# Patient Record
Sex: FEMALE | Race: WHITE | NOT HISPANIC OR LATINO | Employment: FULL TIME | URBAN - METROPOLITAN AREA
[De-identification: names, ages, dates, MRNs, and addresses within clinical notes are randomized per-mention and may not be internally consistent; named-entity substitution may affect disease eponyms.]

---

## 2017-01-11 ENCOUNTER — ALLSCRIPTS OFFICE VISIT (OUTPATIENT)
Dept: OTHER | Facility: OTHER | Age: 37
End: 2017-01-11

## 2017-01-11 DIAGNOSIS — Z13.6 ENCOUNTER FOR SCREENING FOR CARDIOVASCULAR DISORDERS: ICD-10-CM

## 2017-01-11 DIAGNOSIS — R53.83 OTHER FATIGUE: ICD-10-CM

## 2017-02-21 ENCOUNTER — ALLSCRIPTS OFFICE VISIT (OUTPATIENT)
Dept: OTHER | Facility: OTHER | Age: 37
End: 2017-02-21

## 2017-02-28 ENCOUNTER — ALLSCRIPTS OFFICE VISIT (OUTPATIENT)
Dept: OTHER | Facility: OTHER | Age: 37
End: 2017-02-28

## 2017-02-28 DIAGNOSIS — G40.409 OTHER GENERALIZED EPILEPSY AND EPILEPTIC SYNDROMES, NOT INTRACTABLE, WITHOUT STATUS EPILEPTICUS (HCC): ICD-10-CM

## 2017-04-27 ENCOUNTER — ALLSCRIPTS OFFICE VISIT (OUTPATIENT)
Dept: OTHER | Facility: OTHER | Age: 37
End: 2017-04-27

## 2017-05-01 DIAGNOSIS — G40.409 OTHER GENERALIZED EPILEPSY AND EPILEPTIC SYNDROMES, NOT INTRACTABLE, WITHOUT STATUS EPILEPTICUS (HCC): ICD-10-CM

## 2017-05-01 DIAGNOSIS — Z13.1 ENCOUNTER FOR SCREENING FOR DIABETES MELLITUS: ICD-10-CM

## 2017-05-01 DIAGNOSIS — R53.83 OTHER FATIGUE: ICD-10-CM

## 2017-05-01 DIAGNOSIS — E66.9 OBESITY: ICD-10-CM

## 2017-05-01 DIAGNOSIS — R63.5 ABNORMAL WEIGHT GAIN: ICD-10-CM

## 2017-05-08 ENCOUNTER — ALLSCRIPTS OFFICE VISIT (OUTPATIENT)
Dept: OTHER | Facility: OTHER | Age: 37
End: 2017-05-08

## 2017-05-11 ENCOUNTER — GENERIC CONVERSION - ENCOUNTER (OUTPATIENT)
Dept: OTHER | Facility: OTHER | Age: 37
End: 2017-05-11

## 2017-05-22 ENCOUNTER — ALLSCRIPTS OFFICE VISIT (OUTPATIENT)
Dept: OTHER | Facility: OTHER | Age: 37
End: 2017-05-22

## 2018-01-09 NOTE — PROGRESS NOTES
MAY 10 2016         RE: Anastacia Fleischer                               To: [facility]   MR#: [id number]                                    5 Antonia Bautista   : Elvi: [id number]                             Fax: [phone]   (Exam #: [id number])      The LMP of this 30year old,  G2, P1-0-0-1 patient was SEP 5 2015, her   working JONO is 2016 and the current gestational age is 29 weeks 6   days by [address] [place] [address]  A sonographic examination was performed on MAY   10 2016 using real time equipment  The ultrasound examination was   performed using abdominal technique  The patient has a BMI of 35 6  Her   blood pressure today was 120/73  Earliest ultrasound found in her record: 12/3/2015   11w1d  2016 JONO      Cardiac motion was observed at 142 bpm       INDICATIONS      previous    epilepsy   age 29 at time of delivery   fetal growth   obesity      Exam Types      Level I      RESULTS      Fetus # 1 of 1   Vertex presentation   Fetal growth appeared normal   Placenta Location = Posterior   No placenta previa   Placenta Grade = II      MEASUREMENTS (* Included In Average GA)      AC              31 3 cm        35 weeks 3 days* (73%)   BPD              8 9 cm        36 weeks 0 days* (82%)   HC              32 1 cm        35 weeks 5 days* (65%)   Femur            6 6 cm        33 weeks 5 days* (52%)      HC/AC           1 03   FL/AC           0 21   FL/BPD          0 74   EFW (Ac/Fl/Hc)  2568 grams - 5 lbs 11 oz                 (60%)      THE AVERAGE GESTATIONAL AGE is 35 weeks 1 day +/- 21 days        AMNIOTIC FLUID      Q1: 5 2      Q2: 4 9      Q3: 5 6      Q4: 3 0   HNUG Total = 18 7 cm   Amniotic Fluid: Normal      ANATOMY DETAILS      Visualized Appearing Sonographically Normal:   HEAD: (BPD Level, Lateral Ventricles);    STOMACH, RIGHT KIDNEY, LEFT   KIDNEY, BLADDER, PLACENTA      Not Visualized:   HEAD: (Cerebellum, Cisterna Magna); HEART      IMPRESSION      Hayes IUP   35 weeks and 1 day by this ultrasound  (JONO=2016)   33 weeks and 6 days by 1st Tri Sono  (JONO=2016)   Vertex presentation   Fetal growth appeared normal   Regular fetal heart rate of 142 bpm   Posterior placenta   No placenta previa      GENERAL COMMENT      On exam today the patient appears well, in no acute distress, and denies   any complaints  Her abdomen is non-tender  There has been appropriate interval fetal growth  Good fetal movement and   tone are seen  The amniotic fluid volume appears normal   The placenta is   posterior and it appears sonographically normal   The anatomic structures   listed above could not be optimally imaged today because of fetal   position; however, these structures were seen on a prior ultrasound and   appeared sonographically normal   The patient was informed of today's   findings and all of her questions were answered  The limitations of   ultrasound were reviewed with the patient   labor precautions as   well as fetal kick counts were reviewed  Recommend further ultrasounds as clinically indicated  Thank you very much for allowing us to participate in the care of this   very nice patient  Should you have any questions, please do not hesitate   to contact our office  Please note, in addition to the time spent discussing the results of the   ultrasound, I spent approximately 10 minutes of face-to-face time with the   patient, greater than 50% of which was spent in counseling and the   coordination of care for this patient  FRANCISCO Edmond M D     Electronically signed 05/10/16 14:41

## 2018-01-10 NOTE — PROGRESS NOTES
History of Present Illness  HPI: Maxwell Garduno is here for 2 wk VLCD f/u  Current wt 209 9 lbs, loss of 9 6 lbs x 2 wks  She will transition to using 2 meal replacements at this time  Hydration is good  Has been dealing with some constipation  States she made the mistake of utilizing all of her fiber products at once  No issues with energy level  She would like to resume exercise (run/walk)  Calorie guidelines given as well as access to volusion  She will f/u in 3 wks for menu planning  Bariatric MNT [facility]:   Weight Assessment: IBW: 135 lbs, ABW: 153 7 lbs, Weight Change: 9 6 lb loss x 2 wks  Dietary Recall:   Beverages: water  Estimated fluid intake: >80 oz  Food allergies/intolerances: nuts  Exercise Frequency:  She exercises run/walk (has been on hold)  Her obesity/being overweight is related to excess energy intake and as evidenced by BMI=32 9  Nutrition Prescription: Estimated calories for weight loss: eCalc BMR 1680, wt loss w/o exercise 1193-1766, w/exercise 5872-5101, Estimated daily protein needs: 74-92 gm (1 2-1 5 gm/kg IBW) and Estimated daily fluid needs: 72 oz (35 cc/kg IBW)  Breakfast: replacement  Snack: food snack  Lunch: replacement  Snack: food snack  Dinner: 220-305, 21: 3 oz lean pro, veggies, 2 fat  Snack: skip   Nutrition Intervention: Counseling provided with emphasis on meal planning, portion sizes, healthy snack choices, hydration, fiber intake, protein intake, exercise and food journaling  Education materials provided: calorie controlled menus and food snacks  Comprehension: good  Expected Compliance: good  Goals: follow meal plan prescribed, plan meals and snacks daily, food journal, do not skip meals or snacks and 820-1015 calories on rest using 2 replacements  Monitor/Evaluation: weekly weight, food journal, fluid intake and exercise level  Active Problems    1  Body mass index (BMI) of 34 0 to 34 9 in adult (V85 34) (Z68 34)   2  Encounter for gynecological examination (V72 31) (Z01 419)   3  Fatigue (780 79) (R53 83)   4  Obesity (278 00) (E66 9)   5  Post-term Pregnancy - Antepartum Condition Or Prior Complicated Delivery (326 47)   6  Screening for diabetes mellitus (V77 1) (Z13 1)   7  Screening for hypertension (V81 1) (Z13 6)   8  Tonic-clonic epileptic seizures (345 10) (G40 409)   9  Weight gain (783 1) (R63 5)    Surgical History    1  History of  Section Low Transverse    Family History  Mother    1  Family history of High blood cholesterol level  Father    2  Family history of diabetes mellitus (V18 0) (Z83 3)    Social History    · Never A Smoker   · No illicit drug use   · Remote social alcohol use    Current Meds   1  Multiple Vitamin TABS; Therapy: (Recorded:75Cph9948) to Recorded   2  Topiramate 100 MG Oral Tablet; TAKE 1 TABLET TWICE DAILY  Requested for:   64Rrk2073; Last Rx:[id number] Ordered    Allergies    1  Sulfa Drugs    2  Nuts   3  No Known Environmental Allergies    Vitals  Signs   Recorded: 90SGG3236 01:25PM   Height: 5 ft 7 in  Weight: 209 lb 14 4 oz  BMI Calculated: 32 88  BSA Calculated: 2 06    Future Appointments    Date/Time Provider Specialty Site   2017 10:00 AM [facility] [place] [facility]   2018 11:00 AM DREA Sequeira  Neurology [employer]     Signatures   Electronically signed by :  Alethea Haas, ; May 22 2017  1:50PM EST                       (Author)    Electronically signed by : DREA Simmons ; May 22 2017  4:25PM EST                       (Co-author)

## 2018-01-10 NOTE — PROGRESS NOTES
2016         RE: Romain Olinda                               To: [facility]   MR#: [id number]                                    5 Antonia Bautista   : [facility]: [id number]:[facility]                             Fax: [phone]   (Exam #: [id number])      The LMP of this 30year old,  2, para 1 patient was SEP 5 2015, her   working JONO is 2016 and the current gestational age is 33 weeks 1   day by [address] [place] [address]  A sonographic examination was performed on 2016 using real time equipment  The ultrasound examination was performed   using abdominal technique  The patient has a BMI of 34 8  Her blood   pressure today was 128/70  Earliest ultrasound found in her record: 12/3/2015   11w1d  2016 JONO            Cardiac motion was observed at 140 bpm       INDICATIONS      previous    epilepsy   age 29 at time of delivery   fetal growth   obesity      Exam Types      Level I      RESULTS      Fetus # 1 of 1   Breech presentation   Fetal growth appeared normal   Placenta Location = Posterior   No placenta previa   Placenta Grade = II      MEASUREMENTS (* Included In Average GA)      AC              25 6 cm        29 weeks 6 days* (59%)   BPD              7 4 cm        29 weeks 5 days* (58%)   HC              27 3 cm        29 weeks 4 days* (46%)   Femur            5 8 cm        30 weeks 1 day * (63%)      Cerebellum       3 6 cm        31 weeks 3 days      HC/AC           1 07   FL/AC           0 23   FL/BPD          0 78   EFW (Ac/Fl/Hc)  1479 grams - 3 lbs 4 oz                 (60%)      THE AVERAGE GESTATIONAL AGE is 29 weeks 6 days +/- 18 days  AMNIOTIC FLUID      Q1: 6 4      Q2: 3 9      Q3: 3 3      Q4: 5 9   HUNG Total = 19 5 cm   Amniotic Fluid: Normal      ANATOMY      Head                                    Normal   Th  Cav                                  Normal   Heart See Details   Abd  Cav  Normal   Stomach                                 Normal   Right Kidney                            Normal   Left Kidney                             Normal   Bladder                                 Normal   Placenta                                Normal      ANATOMY DETAILS      Visualized Appearing Sonographically Normal:   HEAD: (Calvarium, BPD Level, Lateral Ventricles, Choroid Plexus,   Cerebellum, Cisterna Magna);    TH  CAV : (Diaphragm); HEART: (Cardiac   Axis, Interventricular Septum, Interatrial Septum, Cardiac Position);      ABD  CAV , STOMACH, RIGHT KIDNEY, LEFT KIDNEY, BLADDER, PLACENTA      ANATOMY COMMENTS      Fetal anatomy has been previously documented; no anomalies were   identified  No fetal structural abnormality is identified on the Level I   survey today  Follow up anatomy of the lateral ventricles, diaphragm,    kidneys, stomach and bladder appear normal  Fetal interval growth and   amniotic fluid volume are normal       IMPRESSION      Hayes IUP   29 weeks and 6 days by this ultrasound  (JONO=2016)   29 weeks and 1 day by 1st Tri Sono  (JONO=2016)   Breech presentation   Fetal growth appeared normal   Regular fetal heart rate of 140 bpm   Posterior placenta   No placenta previa      GENERAL COMMENT         I had the pleasure of seeing Mira Ruth  in the [facility]    today for followup growth scan  She reports normal daily fetal movements  She denies any vaginal bleeding, leakage of fluid, or significant   contractions or pelvic pressure  There has been no major pregnancy   complications since her last visit here in the  Center  She has a   history of epilepsy  She will be 36 at the time of delivery  She has an   elevated body mass index of 34  Chest has a history of a prior   On today's ultrasound, the fetus was in a breech presentation   The   placenta is posterior in location which is reassuring  The amniotic fluid   appeared very normal today  Fetal growth was within normal range  The   interval anatomy seen today showed no obvious anomalies  We discussed the importance of initiating fetal kick counting at least   once daily  We discussed the "10 kicks in 2 hour rule"  I instructed her   to report to you immediately should criteria not be met  Kick counts   should begin at 28 weeks gestation  IMPORTANT  FINDINGS ON TODAY'S ULTRASOUND: Breech presentation         IN SUMMARY:  Today's ultrasound was reassuring as the fetus is growing   well with normal amniotic fluid  The fetus was in a breech presentation  She should continue to do her kick counts on a daily basis  A fetal growth   evaluation is recommended in 5 weeks and this was scheduled today  Face-to-face time, in addition to time spent discussing ultrasound results   was 10 minutes, with greater than 50% of the time used for counseling and   coordination of care  A description of the counseling/coordination of care   is described above  Thank you very much for allowing us to participate in the care of your   patient  If you have any questions or concerns about today's visit please   do not hesitate to call me  Thank you very much  Judge Janusz KLEIN  Maternal Fetal Medicine      FRANCISCO Hensley M D     Maternal-Fetal Medicine   Electronically signed 04/07/16 18:56

## 2018-01-12 NOTE — PROGRESS NOTES
2016         RE: Dell Has                               To: [facility]   MR#: [id number]                                    Wilber Bautista   : Elvi: [id number]:AIBRIANR                             Fax: [phone]   (Exam #: [id number])      The LMP of this 30year old,  2, para 1 patient was SEP 5 2015, her   working JONO is 2016 and the current gestational age is 22 weeks 1   day by Vijay Gandhi  A sonographic examination was performed on 2016 using real time equipment  The ultrasound examination was   performed using abdominal & vaginal techniques  The patient has a BMI of   32 5  Her blood pressure today was 121/62        Earliest ultrasound found in her record: 12/3/2015   11w1d  2016 JONO            Cardiac motion was observed at 147 bpm       INDICATIONS      previous    epilepsy   age 29 at time of delivery      Exam Types      LEVEL II   Transvaginal      RESULTS      Fetus # 1 of 1   Vertex presentation   Fetal growth appeared normal   Placenta Location = Posterior   No placenta previa   Placenta Grade = I      MEASUREMENTS (* Included In Average GA)      OFD              6 9 cm   AC              15 8 cm        20 weeks 4 days* (43%)   BPD              5 1 cm        21 weeks 3 days* (56%)   HC              19 3 cm        21 weeks 3 days* (59%)   Femur            3 5 cm        21 weeks 3 days* (53%)      Nuchal Fold      5 7 mm      Humerus          3 4 cm        21 weeks 5 days  (61%)   Radius           2 8 cm        21 weeks 6 days   Ulna             3 1 cm        21 weeks 4 days   Tibia            3 2 cm        21 weeks 4 days  (56%)   Fibula           3 2 cm        21 weeks 0 days   Foot             3 7 cm        21 weeks 4 days      Cerebellum       2 2 cm        21 weeks 3 days   CisternaMagna    5 6 mm      HC/AC           1 23   FL/AC           0 22   FL/BPD 0 70   EFW (Ac/Fl/Hc)   399 grams - 0 lbs 14 oz      THE AVERAGE GESTATIONAL AGE is 21 weeks 1 day +/- 10 days  AMNIOTIC FLUID      Largest Vertical Pocket = 4 0 cm   Amniotic Fluid: Normal      CERVICAL EVALUATION           Supine               Cervical Length: 4 40 cm        Other Test Results         Resp To T F  Pressure: No                    Funneling?: No              Dynamic Changes?: No      ANATOMY DETAILS      Visualized Appearing Sonographically Normal:   HEAD: (Calvarium, BPD Level, Lateral Ventricles, Choroid Plexus,   Cerebellum, Cisterna Magna);    FACE/NECK: (Neck, Nuchal Fold, Orbits,   Nose/Lips, Palate, Face);    TH  CAV : (Diaphragm); HEART: (Four   Chamber View, Proximal Left Outflow, Proximal Right Outflow, Aortic Arch,   Ductal Arch, Short Axis of Greater Vessels, Cardiac Axis, Interventricular   Septum, Interatrial Septum, Cardiac Position);    ABD  CAV , STOMACH,   RIGHT KIDNEY, LEFT KIDNEY, BLADDER, ABD  WALL, SPINE: (Cervical Spine,   Thoracic Spine, Lumbar Spine, Sacrum);    EXTREMS: (Lt Humerus, Rt   Humerus, Lt Forearm, Rt Forearm, Lt Hand, Rt Hand, Lt Femur, Rt Femur, Lt   Low Leg, Rt Low Leg, Lt Foot, Rt Foot);    GENITALIA, PLACENTA, UMBL  CORD, UTERUS      Suboptimally Visualized:   FACE/NECK: (Profile)      ADNEXA      The left ovary appeared normal and measured 1 7 x 1 8 x 3 1 cm with a   volume of 5 0 cc  The right ovary appeared normal and measured 2 9 x 2 2 x   1 3 cm with a volume of 4 3 cc  IMPRESSION      Hayes IUP   21 weeks and 1 day by this ultrasound  (JONO=JUN 22 2016)   21 weeks and 1 day by [facility] Tri Sono  (JONO=JUN 22 2016)   Vertex presentation   Fetal growth appeared normal   Regular fetal heart rate of 147 bpm   Posterior placenta   No placenta previa      GENERAL COMMENT      On exam today the patient appears well, in no acute distress, and denies   any complaints  Her abdomen is non-tender        The patient had noninvasive prenatal testing through [place] plus test   Her results were normal, placing her in a   very low risk category  The sensitivity of detecting Trisomy 21 with this   test is 99 1% with a specificity of 99 9%  The sensitivity of detecting   Trisomy 18 is >99 9% with a specificity of 99 6%  The sensitivity of   detecting Trisomy 13 is 91 7% with a specificity of 99 7%  Her negative   result is very reassuring to rule out the aforementioned Trisomies  The fetal anatomic survey is complete for suboptimal visualization of the   fetal profile  3D imaging of the face appears normal   There is no   sonographic evidence of fetal abnormalities at this time  Good fetal   movement and tone are seen  The amniotic fluid volume appears normal     The placenta is posterior and it appears sonographically normal   A   transvaginal ultrasound was performed to assess the cervix, which was not   seen well transabdominally  The cervical length was 4 4 centimeters,   which is normal for the current gestational age  There was no significant   funneling or dynamic changes appreciated  The patient was informed of   today's findings and all of her questions were answered  The limitations   of ultrasound were reviewed with the patient, which she accepts  Recommend the patient return in 8 weeks for a growth ultrasound given her   advanced maternal age  Please note, in addition to the time spent discussing the results of the   ultrasound, I spent approximately 10 minutes of face-to-face time with the   patient, greater than 50% of which was spent in counseling and the   coordination of care for this patient  Thank you very much for allowing us to participate in the care of this   very nice patient  Should you have any questions, please do not hesitate   to contact our office  FRANCISCO Leija M D     Electronically signed 02/11/16 16:18

## 2018-01-12 NOTE — PROGRESS NOTES
Elton Reyes is here for initial RD session for VLCD  Program discussed including: ketosis, hydration, potential side effects, nutrient composition  She is allergic to nuts, states specifically walnuts--no issues with other nuts  Discussed which products contain nuts  2 weeks of product ordered and received  Will f/u 5/11 via phone  Active Problems    1  Body mass index (BMI) of 34 0 to 34 9 in adult (V85 34) (Z68 34)   2  Encounter for gynecological examination (V72 31) (Z01 419)   3  Fatigue (780 79) (R53 83)   4  Obesity (278 00) (E66 9)   5  Post-term Pregnancy - Antepartum Condition Or Prior Complicated Delivery (340 20)   6  Screening for diabetes mellitus (V77 1) (Z13 1)   7  Screening for hypertension (V81 1) (Z13 6)   8  Tonic-clonic epileptic seizures (345 10) (G40 409)   9  Weight gain (783 1) (R63 5)    Current Meds   1  Multiple Vitamin TABS; Therapy: (Recorded:53Cuo3567) to Recorded   2  Topiramate 100 MG Oral Tablet (Topamax); TAKE 1 TABLET TWICE DAILY  Requested   for: 35Apx4941; Last Rx:[id number] Ordered    Allergies    1  Sulfa Drugs    2  Nuts   3  No Known Environmental Allergies    Signatures   Electronically signed by :  Sloan Boo, ; May  8 2017 11:03AM EST                       (Author)

## 2018-01-13 VITALS
HEIGHT: 69 IN | DIASTOLIC BLOOD PRESSURE: 84 MMHG | WEIGHT: 216 LBS | SYSTOLIC BLOOD PRESSURE: 132 MMHG | BODY MASS INDEX: 31.99 KG/M2

## 2018-01-13 VITALS — WEIGHT: 219.5 LBS | BODY MASS INDEX: 34.45 KG/M2 | HEIGHT: 67 IN

## 2018-01-13 VITALS — WEIGHT: 209.9 LBS | HEIGHT: 67 IN | BODY MASS INDEX: 32.94 KG/M2

## 2018-01-17 NOTE — PROGRESS NOTES
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)   4  Obesity (278 00) (E66 9)    Plan  Fatigue    · (1) TSH WITH FT4 REFLEX; Status:Active; Requested ZOX:[id number];   Fatigue, Screening for hypertension    · (1) CBC/PLT/DIFF; Status:Active; Requested ISO:[id number];   Obesity    · *1 - [facility]ician Referral  Evaluate and treat  Difficult with weight loss  Interested in nutritional counseling  Status: Active  Requested  for: [id number]  Care Summary provided  : Yes  Screening for hypertension    · (1) BASIC METABOLIC PROFILE; Status:Active; Requested ONV:91LCG8455;    · (1) LIPID PANEL, FASTING; Status:Active; Requested RRA:[id number];   Tonic-clonic epileptic seizures    · From  Topamax 100 MG Oral Tablet TAKE 1 TABLET TWICE DAILY To  Topiramate 100 MG Oral Tablet (Topamax) TAKE 1 TABLET TWICE DAILY   · *1 - [address] Physician Referral  Patient has Hx pubertal onset grand  mal seizures/epilepsy  Please evaluate and treat  Patient wishes to establish care with  new neurologist   Status: Hold For - Scheduling  Requested for: 27PLE2495  Care Summary provided  : Yes    Discussion/Summary  health maintenance visit Currently, she eats a healthy diet and has an adequate exercise regimen  the risks and benefits of cervical cancer screening were discussed cervical cancer screening is current cervical cancer screening is needed every year cervical cancer screening is managed by OB Colorectal cancer screening: colorectal cancer screening is not indicated  Screening lab work includes glucose, lipid profile and thyroid function testing  The risks and benefits of immunizations were discussed  Advice and education were given regarding nutrition, aerobic exercise and seat belt use  Patient discussion: discussed with the patient  Pap - done last year with OB    Epilepsy - needs new neurologist  Will prescribe Rx until then  Weight gain - referral to weight management center  Recommended diet and exercise  RTC PRN or 1 year  Patient is able to Self-Care  Chief Complaint  Patient here for annual health maintenance visit  History of Present Illness  HM, Adult Female: The patient is being seen for a health maintenance evaluation  General Health: The patient's health since the last visit is described as good  Immunizations status: not up to date  Lifestyle:  She has weight concerns  She does not use tobacco  She denies alcohol use  She denies drug use  Reproductive health:  she reports normal menses  Screening:   Metabolic screening includes no previous lipid profile, no previous glucose screening, no previous thyroid function test and no previous DEXA  HPI: Patient wishes to establish care with new neurologist - she was dismissed from prior office after 2 no-shows  - Onset at puberty, grand mal seizures, has been seizure free for 3 years on Topamax 100 BID   - Not breastfeeding due to medication    Weight gain following last pregnancy  Unable to lose weight  She has been using the stationary bike daily, 10 miles - or walking  Has been logging foods with [employer]  Carb limit of 25g daily  calories generally [id number]  Became tearful discussing weight loss difficulty  She had loss weight after her first pregnancy using this method, but it's not working now  We discussed strategies and gave her a weight management center referral as a "back up" if she feels she needs more help  Review of Systems    Over the past 2 weeks, how often have you been bothered by the following problems? 1 ) Little interest or pleasure in doing things? Not at all    2 ) Feeling down, depressed or hopeless? Not at all    3 ) Trouble falling asleep or sleeping too much? Not at all    4 ) Feeling tired or having little energy? Not at all    5 ) Poor appetite or overeating? Not at all  6  ) Feeling bad about yourself, or that you are a failure, or have let yourself or your family down? Not at all    7 ) Trouble concentrating on things, such as reading a newspaper or watching television? Not at all    8 ) Moving or speaking so slowly that other people could have noticed, or the opposite, moving or speaking faster than usual? Not at all  How difficult have these problems made it for you to do your work, take care of things at home, or get along with people? Not at all  Score      ROS reviewed  Active Problems    1  Post-term Pregnancy - Antepartum Condition Or Prior Complicated Delivery (399 37)    Family History  Mother    · Family history of High blood cholesterol level  Father    · Family history of diabetes mellitus (V18 0) (Z83 3)    Social History    · Never A Smoker    Current Meds   1  Topamax 100 MG Oral Tablet; TAKE 1 TABLET TWICE DAILY; Therapy: (Recorded:74Pmx3543) to Recorded    Allergies    1  Sulfa Drugs    2  Nuts   3  No Known Environmental Allergies    Vitals   Recorded: 14UDT3520 03:58PM   Temperature 99 2 F, Tympanic   Heart Rate 80   Respiration 14   Systolic 880, LUE, Sitting   Diastolic 80, LUE, Sitting   BP CUFF SIZE Large   Height 5 ft 9 in   Weight 224 lb    BMI Calculated 33 08   BSA Calculated 2 17   Pain Scale 0     Physical Exam    Constitutional   General appearance: No acute distress, well appearing and well nourished  overweight  Eyes   Conjunctiva and lids: No swelling, erythema or discharge  Pupils and irises: Equal, round, reactive to light  Ears, Nose, Mouth, and Throat   Nasal mucosa, septum, and turbinates: Normal without edema or erythema  Oropharynx: Normal with no erythema, edema, exudate or lesions  Neck   Thyroid: Normal, no thyromegaly  Pulmonary   Respiratory effort: No increased work of breathing or signs of respiratory distress  Auscultation of lungs: Clear to auscultation      Cardiovascular   Auscultation of heart: Normal rate and rhythm, normal S1 and S2, no murmurs  Musculoskeletal   Gait and station: Normal     Skin   Skin and subcutaneous tissue: Normal without rashes or lesions  Psychiatric   Judgment and insight: Normal     Orientation to person, place, and time: Normal        Attending Note  Attending Note: Attending Note: I discussed the case with the Resident and reviewed the Resident's note and I agree with the Resident management plan as it was presented to me  Level of Participation: I was present in clinic, but did not examine the patient  Diagnosis and Plan: Health maintenance: update with pap  Epilepsy: renew AED, need to follow up with neurologist  I agree with the Resident's note  Future Appointments    Date/Time Provider Specialty Site   02/21/2017 01:00 PM DREA De La Rosa   Obstetrics/Gynecology [facility]     Signatures   Electronically signed by : DREA Mario ; Jan 11 2017  5:23PM EST                       (Author)    Electronically signed by : DREA Mcgraw ; Jan 16 2017  4:39PM EST                       (Author)

## 2018-01-22 VITALS
HEART RATE: 80 BPM | HEIGHT: 69 IN | BODY MASS INDEX: 33.18 KG/M2 | SYSTOLIC BLOOD PRESSURE: 118 MMHG | DIASTOLIC BLOOD PRESSURE: 80 MMHG | TEMPERATURE: 99.2 F | RESPIRATION RATE: 14 BRPM | WEIGHT: 224 LBS

## 2018-01-22 VITALS
WEIGHT: 218.7 LBS | TEMPERATURE: 97.9 F | DIASTOLIC BLOOD PRESSURE: 64 MMHG | SYSTOLIC BLOOD PRESSURE: 110 MMHG | BODY MASS INDEX: 34.33 KG/M2 | HEIGHT: 67 IN | HEART RATE: 68 BPM

## 2018-02-12 ENCOUNTER — TELEPHONE (OUTPATIENT)
Dept: NEUROLOGY | Facility: CLINIC | Age: 38
End: 2018-02-12

## 2018-02-22 RX ORDER — MULTIVITAMIN
1 TABLET ORAL DAILY
COMMUNITY

## 2018-02-27 ENCOUNTER — OFFICE VISIT (OUTPATIENT)
Dept: NEUROLOGY | Facility: CLINIC | Age: 38
End: 2018-02-27
Payer: COMMERCIAL

## 2018-02-27 VITALS
HEART RATE: 72 BPM | WEIGHT: 210.2 LBS | HEIGHT: 69 IN | DIASTOLIC BLOOD PRESSURE: 80 MMHG | SYSTOLIC BLOOD PRESSURE: 130 MMHG | BODY MASS INDEX: 31.13 KG/M2

## 2018-02-27 DIAGNOSIS — G40.409 TONIC-CLONIC EPILEPTIC SEIZURES (HCC): Primary | ICD-10-CM

## 2018-02-27 DIAGNOSIS — G43.109 MIGRAINE WITH AURA AND WITHOUT STATUS MIGRAINOSUS, NOT INTRACTABLE: ICD-10-CM

## 2018-02-27 PROCEDURE — 99214 OFFICE O/P EST MOD 30 MIN: CPT | Performed by: PSYCHIATRY & NEUROLOGY

## 2018-02-27 RX ORDER — TOPIRAMATE 50 MG/1
150 TABLET, FILM COATED ORAL 2 TIMES DAILY
Qty: 180 TABLET | Refills: 11 | Status: SHIPPED | OUTPATIENT
Start: 2018-02-27 | End: 2018-12-12 | Stop reason: SDUPTHER

## 2018-02-27 RX ORDER — ZOLMITRIPTAN 5 MG/1
1 SPRAY NASAL AS NEEDED
Qty: 6 ML | Refills: 5 | Status: SHIPPED | OUTPATIENT
Start: 2018-02-27 | End: 2020-06-19 | Stop reason: ALTCHOICE

## 2018-02-27 NOTE — Clinical Note
Please obtain office notes and records related to MRIs from Dr Adelina Lion  Obtain imaging on disc once MRI imaging reports are obtained  Patient tells me she was getting annual MRI's with Dr Adelina Lion in the past, but I have yet to see any reports

## 2018-02-27 NOTE — PROGRESS NOTES
Patient ID: Glenda Pruett is a 40 y o  female  Assessment/Plan:    No problem-specific Assessment & Plan notes found for this encounter  {Assess/PlanSmartLinks:86949}       Subjective:    HPI    {[facility] HPI texts:64350}    {Common ambulatory SmartLinks:25670}         Objective:    Blood pressure 130/80, pulse 72, height 5' 9" (1 753 m), weight 95 3 kg (210 lb 3 2 oz), not currently breastfeeding  Physical Exam    Neurological Exam      ROS:    Review of Systems   Constitutional: Negative  Negative for appetite change and fever  HENT: Negative  Negative for hearing loss, tinnitus, trouble swallowing and voice change  Eyes: Negative  Negative for photophobia and pain  Respiratory: Negative  Negative for shortness of breath  Cardiovascular: Negative  Negative for palpitations  Gastrointestinal: Negative  Negative for nausea and vomiting  Endocrine: Negative  Negative for cold intolerance and heat intolerance  Genitourinary: Negative  Negative for dysuria, frequency and urgency  Musculoskeletal: Negative  Negative for myalgias and neck pain  Facial Clench- Right side only   Skin: Negative  Negative for rash  Neurological: Positive for headaches  Negative for dizziness, tremors, seizures, syncope, facial asymmetry, speech difficulty, weakness, light-headedness and numbness  Hematological: Negative  Does not bruise/bleed easily  Psychiatric/Behavioral: Negative  Negative for confusion, hallucinations and sleep disturbance

## 2018-02-27 NOTE — PROGRESS NOTES
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 with seizure freedom for about 5 years while on monotherapy with topiramate  Her neurological exam is normal  There are no clear seizure risk factors  EEG data (temporal slowing and possible temporal discharges, not personally reviewed) and semiology suggest focal epilepsy  Second hand report suggests there is a left temporal lesion on MRI  MRI reports and imaging have been requested, but not reviewed at this point  She was scheduled to have a one year follow up MRI sometime last year as well as an EEG, but these have not been completed  We have not been able to obtain prior records of these MRIs  I will have our office make another attempt to obtain records from Dr Lopes Simpler  Will repeat MRI if records reveal any need for follow-up imaging or if history is not clarified      Migraine headache returned in December and has worsened since  Topiramate had completely controlled migraines for a while  Will increase topiramate today for migraine prophylaxis  Will also start zolmitriptan nasal spray which was used successfully in the past     Patient Instructions   1  Increase topiramate to 100 mg AM / 150 mg PM x 1 week and then take 150 mg twice daily  2  Use Zomig at first sign of severe headache  3  Let us know if headaches do not improve  Diagnoses and all orders for this visit:    Tonic-clonic epileptic seizures ([address] )  -     topiramate (TOPAMAX) 50 MG tablet; Take 3 tablets (150 mg total) by mouth 2 (two) times a day    Migraine with aura and without status migrainosus, not intractable  -     ZOLMitriptan (ZOMIG) 5 MG nasal solution; 1 spray into each nostril as needed for migraine  -     topiramate (TOPAMAX) 50 MG tablet;  Take 3 tablets (150 mg total) by mouth 2 (two) times a day    Other orders  -     Multiple Vitamin tablet; Take by mouth        Gloria Ely is a right handed woman returning to the [place] [address] [facility] for follow up regarding epilepsy, she also has a history of migraine  She was last seen in February 2017 at which point there has been about 4 years of seizure freedom  There been no seizures since  Interval Events:   Seizures since last visit: None  Hospitalizations: no    Migraines returned in December  Frontal, right more than left  Not nausea  +photo/phonophobia  Pressure and pounds  Last about 4 hours  Face feels warm before it starts  In January she had 3  There were about 5 this month  Feels like her eye on the right is tight, any time, not just with HA  Mother might notice it  Will last just a second  No particular trigger  Advil +/- APAP might help, but not sure  Current AEDs:  Topiramate 100 mg twice daily  Medication side effects: None  Medication adherence: Yes    Event/Seizure semiology:  1  GTC seizure  None for 4-5 years  2  Aura involving black and white vision, tunnel vision, racing heart sensation may occur  Precedes # by 30 seconds or may rarely occur without progression  None for 4-5 years  Special Features  Status epilepticus: no  Self Injury Seizures: yes - falls  Precipitating Factors: none    Epilepsy Risk Factors: Told by Dr Regine Machado that she has a lesion in her brain, details uncertain  Normal birth and development  No history of seizures as a child  No family history of seizures  No head trauma resulting in loss of consciousness  No history of stroke or CNS infection  Prior AEDs:  Carbamazepine  Lamotrigine ineffective, forgetful  Levetiracetam ineffective  Pregabalin - couldnât feel feet  Vimpat  Thinks there were others, but does not recall    Prior Evaluation:  Was getting annual MRIs with Dr Regine Machado, apparently to follow a lesion in the left temporal lobe   (records requested from [facility],  care everyhwere records in Epic)     Greater than 1 hour EEG: Mildly abnormal EEG in wakefulness and sleep due to the presence of two episodes in drowsiness of focal left temporal slowing (T3 maximal)  48 hour AEEG 11/9/2009 (Dr Austin Hager): The patient's noted numbing in face and headache were not associated with epileptiform activity  In comparison with a prior greater than one hour EEG read by myself on 9/30/2009 that study was mildly suggestive of a left temporal abnormality which was not clearly demonstrated during this study  REEG 3/30/2012 (Dr Marianne Esqueda): Normal awake and asleep  REEG 1/2/2013 (Dr Marianne Esqueda): bitemporal independent sharp waves  History Reviewed: The following were reviewed and updated as appropriate: allergies, current medications, past medical history, past social history and problem list    Psychiatric History:  None    Social History:   Driving: Yes  Lives Alone: No  Occupation: Realtor     ROS:  Review of Systems  Constitutional: Negative  Negative for appetite change and fever  HENT: Negative  Negative for hearing loss, tinnitus, trouble swallowing and voice change  Eyes: Negative  Negative for photophobia and pain  Respiratory: Negative  Negative for shortness of breath  Cardiovascular: Negative  Negative for palpitations  Gastrointestinal: Negative  Negative for nausea and vomiting  Endocrine: Negative  Negative for cold intolerance and heat intolerance  Genitourinary: Negative  Negative for dysuria, frequency and urgency  Musculoskeletal: Negative  Negative for myalgias and neck pain  Facial Clench- Right side only   Skin: Negative  Negative for rash  Neurological: Positive for headaches  Negative for dizziness, tremors, seizures, syncope, facial asymmetry, speech difficulty, weakness, light-headedness and numbness  Hematological: Negative  Does not bruise/bleed easily  Psychiatric/Behavioral: Negative    Negative for confusion, hallucinations and sleep disturbance  Ten systems were reviewed and negative except for what is documented separately or in the HPI  Objective    /80 (BP Location: Right arm, Patient Position: Sitting, Cuff Size: Adult)   Pulse 72   Ht 5' 9" (1 753 m)   Wt 95 3 kg (210 lb 3 2 oz)   BMI 31 04 kg/m²      General Exam  No acute distress  Neurologic Exam  Mental Status:  Alert and oriented x 3  Language: normal fluency and comprehension  Gait: Normal casual gait

## 2018-02-27 NOTE — PATIENT INSTRUCTIONS
1  Increase topiramate to 100 mg AM / 150 mg PM x 1 week and then take 150 mg twice daily  2  Use Zomig at first sign of severe headache  3  Let us know if headaches do not improve

## 2018-02-28 ENCOUNTER — TELEPHONE (OUTPATIENT)
Dept: NEUROLOGY | Facility: CLINIC | Age: 38
End: 2018-02-28

## 2018-03-26 ENCOUNTER — ANNUAL EXAM (OUTPATIENT)
Dept: OBGYN CLINIC | Facility: CLINIC | Age: 38
End: 2018-03-26
Payer: COMMERCIAL

## 2018-03-26 VITALS
WEIGHT: 204.4 LBS | DIASTOLIC BLOOD PRESSURE: 86 MMHG | BODY MASS INDEX: 30.27 KG/M2 | HEIGHT: 69 IN | SYSTOLIC BLOOD PRESSURE: 120 MMHG

## 2018-03-26 DIAGNOSIS — Z01.419 WOMEN'S ANNUAL ROUTINE GYNECOLOGICAL EXAMINATION: Primary | ICD-10-CM

## 2018-03-26 DIAGNOSIS — N94.3 PMS (PREMENSTRUAL SYNDROME): ICD-10-CM

## 2018-03-26 PROCEDURE — S0612 ANNUAL GYNECOLOGICAL EXAMINA: HCPCS | Performed by: OBSTETRICS & GYNECOLOGY

## 2018-03-26 NOTE — PROGRESS NOTES
71-year-old white female, she is a  2 para 2 with 2 prior  sections  Her current method of contraception includes a tubal ligation  Her menstrual cycles are regular and predictable  Upon further questions she is having a problem with premenstrual syndrome  She gets a classic example of increasing anxiety and stress admit cycle with great relief after the onset of her menses  We had a discussion about dietary changes including decreasing caffeine, vitamin B6, increased water intake  We will struggle increasing exercise  I also talked to patient the possibility of using an SSRI use the Prozac or Zoloft on low doses  She may consider  She will call me if she changes her mind  She denies any particular gynecological  GI complaint  There is no problem with intimacy  There are no new major family illnesses report  Menstrual cycles are regular predictable  Review of systems negative except for premenstrual syndrome      Medication list reviewed she is taking Topamax for history of a seizure disorder  Surgical history significant for 2 prior  sections and a tubal ligation      Family history is noncontributory        Social history negative for tobacco minimal alcohol          Physical exam this is a well-developed well-nourished white female acute distress heart and lung exam within normal limits the abdomen is soft there are no masses  scars are present will heal   Breast exam symmetrical no masses no pain  Axillary exam bilaterally was negative pelvic examination the external genitalia normal limits the vagina is clean cervix is closed  Uterus is anterior normal size a Pap smear was done  Adnexal was unremarkable  Impression stable gyn examination  History consistent with premenstrual syndrome  We made suggestions about dietary changes including decrease caffeine, increasing water and vitamin B6  She was also instructed to increase exercise    If these are not affected we may consider an SSRI I treatment  She will call me with her follow-up

## 2018-03-26 NOTE — PATIENT INSTRUCTIONS
The patient was informed of a stable gyn examination  A Pap smear was performed  We had a discussion about premenstrual syndrome  She will try make changes  If no improvement may consider short course of Prozac or Zoloft

## 2018-03-28 LAB
CYTOLOGIST CVX/VAG CYTO: NORMAL
DX ICD CODE: NORMAL
HPV I/H RISK 1 DNA CVX QL PROBE+SIG AMP: NEGATIVE
OTHER STN SPEC: NORMAL
PATH REPORT.FINAL DX SPEC: NORMAL
SL AMB NOTE:: NORMAL
SL AMB SPECIMEN ADEQUACY: NORMAL

## 2018-07-02 ENCOUNTER — TELEPHONE (OUTPATIENT)
Dept: NEUROLOGY | Facility: CLINIC | Age: 38
End: 2018-07-02

## 2018-07-02 DIAGNOSIS — T75.3XXS MOTION SICKNESS, SEQUELA: Primary | ICD-10-CM

## 2018-07-02 NOTE — TELEPHONE ENCOUNTER
pt is going on a cruise  and is asking if she can take anything for  motion sickness  she states that she does get car sick  she took dramamine in the past   she is asking if it is ok to take dramamine or if there is anything better that she can take      topamax 50mg 3 tabs bid  [phone]

## 2018-07-02 NOTE — TELEPHONE ENCOUNTER
Pt called and advised pt of all of the below  She states that she took dramanine in the past and it did not work for her  Is there anything she can take?

## 2018-07-03 PROBLEM — T75.3XXA MOTION SICKNESS: Status: ACTIVE | Noted: 2018-07-03

## 2018-07-03 RX ORDER — SCOLOPAMINE TRANSDERMAL SYSTEM 1 MG/1
1 PATCH, EXTENDED RELEASE TRANSDERMAL
Qty: 10 PATCH | Refills: 0 | Status: SHIPPED | OUTPATIENT
Start: 2018-07-03 | End: 2018-12-12

## 2018-07-03 NOTE — TELEPHONE ENCOUNTER
Scopolamine patch may be a good option  Patch is placed behind ear starting 12 hours before cruise and then replaced every 72 hours  A second patch can be added if one is not therapeutic  Potential side effects of scopolamine include sedation, blurred vision, dry mouth and, in older adults, confusion and urinary retention  Some patients develop dermatitis from the scopolamine patch  Scopolamine is contraindicated in people at risk for angle-closure glaucoma  I can send Rx if she agrees  Other antihistamines could also be tried  Scopolamine and all antihistamines can be sedating in some patients  Non prescription interventions:   Lower deck and midship cabins are recommended on the cruise  Studies show benefit from sucking hard yandel candies: use if experiencing or anticipating symptoms

## 2018-07-03 NOTE — TELEPHONE ENCOUNTER
Called and advised pt of all of the below  She verbalized clear understanding of all instructions  Agreeable trying scopolamine  Pls send rx to [address] [place] [address]        Thank you

## 2018-12-12 ENCOUNTER — OFFICE VISIT (OUTPATIENT)
Dept: NEUROLOGY | Facility: CLINIC | Age: 38
End: 2018-12-12
Payer: COMMERCIAL

## 2018-12-12 VITALS
BODY MASS INDEX: 31.03 KG/M2 | WEIGHT: 209.5 LBS | DIASTOLIC BLOOD PRESSURE: 74 MMHG | HEIGHT: 69 IN | HEART RATE: 71 BPM | SYSTOLIC BLOOD PRESSURE: 126 MMHG

## 2018-12-12 DIAGNOSIS — G43.109 MIGRAINE WITH AURA AND WITHOUT STATUS MIGRAINOSUS, NOT INTRACTABLE: ICD-10-CM

## 2018-12-12 DIAGNOSIS — R90.89 ABNORMAL BRAIN MRI: ICD-10-CM

## 2018-12-12 DIAGNOSIS — G40.409 TONIC-CLONIC EPILEPTIC SEIZURES (HCC): Primary | ICD-10-CM

## 2018-12-12 DIAGNOSIS — G24.8 CRANIOFACIAL DYSTONIA: ICD-10-CM

## 2018-12-12 PROCEDURE — 99214 OFFICE O/P EST MOD 30 MIN: CPT | Performed by: PSYCHIATRY & NEUROLOGY

## 2018-12-12 RX ORDER — TOPIRAMATE 50 MG/1
150 TABLET, FILM COATED ORAL 2 TIMES DAILY
Qty: 540 TABLET | Refills: 3 | Status: SHIPPED | OUTPATIENT
Start: 2018-12-12 | End: 2019-12-13 | Stop reason: SDUPTHER

## 2018-12-12 NOTE — PROGRESS NOTES
Review of Systems   Constitutional: Negative  Negative for appetite change and fever  HENT: Positive for congestion  Negative for hearing loss, tinnitus, trouble swallowing and voice change  Eyes: Negative  Negative for photophobia and pain  Respiratory: Negative  Negative for shortness of breath  Cardiovascular: Negative  Negative for palpitations  Gastrointestinal: Negative  Negative for nausea and vomiting  Endocrine: Negative  Negative for cold intolerance and heat intolerance  Genitourinary: Negative  Negative for dysuria, frequency and urgency  Musculoskeletal: Negative  Negative for myalgias and neck pain  Skin: Negative  Negative for rash  Neurological: Negative  Negative for dizziness, tremors, seizures, syncope, facial asymmetry, speech difficulty, weakness, light-headedness, numbness and headaches  Hematological: Negative  Does not bruise/bleed easily  Psychiatric/Behavioral: Negative  Negative for confusion, hallucinations and sleep disturbance

## 2018-12-12 NOTE — PROGRESS NOTES
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 with seizure freedom for about 5 years while on monotherapy with topiramate  Her neurological exam is normal  There are no clear seizure risk factors  EEG data (temporal slowing and possible temporal discharges, not personally reviewed) and semiology suggest focal epilepsy  Second hand report suggests there is a left temporal lesion on MRI  MRI reports and imaging have been requested, but not reviewed at this point  She was scheduled to have a one year follow up MRI sometime last year as well as an EEG, but these have not been completed  We have not been able to obtain prior records of these MRIs  I will have our office make another attempt to obtain records from Dr Aleyda Troncoso  Will repeat MRI if records reveal any need for follow-up imaging or if history is not clarified  Right facial spasm around the eye is occurring a few times per day over the past 8-12 months  Differential includes tick and dystonia  Focal seizure is much less likely  Will get brain MRI to follow up on prior reported abnormality reported to involve the left temporal lobe and to evaluate for cause of new right facial spasms       Migraine controlled  Continue topiramate 150 mg bid  Patient Instructions   1  Schedule brain MRI  2  Continue topiramate 150 mg twice daily  3  Return in about 3 month  Diagnoses and all orders for this visit:    Tonic-clonic epileptic seizures ([address] )  -     MRI brain seizure wo and w contrast; Future  -     Basic metabolic panel; Future  -     topiramate (TOPAMAX) 50 MG tablet; Take 3 tablets (150 mg total) by mouth 2 (two) times a day    Migraine with aura and without status migrainosus, not intractable  -     MRI brain seizure wo and w contrast; Future  -     topiramate (TOPAMAX) 50 MG tablet;  Take 3 tablets (150 mg total) by mouth 2 (two) times a day    Craniofacial dystonia  -     MRI brain seizure wo and w contrast; Future    Other orders  -     B Complex Vitamins (B COMPLEX PO); Take 1 tablet by mouth daily        Gloria Olvera is a right handed woman returning to the [address] [facility] for follow up regarding epilepsy, she also has a history of migraine  She was last seen in February 2017 at which point there has been about 4 years of seizure freedom  There been no seizures since  Interval Events:   Seizures since last visit: None  Hospitalizations: no    Migraines are completely controlled  Mild headaches are not impacting her day  Right eye tightens up a few times per day  Lasts a few seconds  Others point it out to her at times  She may feel a little twitch of her eye prior to it happening  Started 8-12 months ago  Not able to identify triggers  Never happened in the past  Doesn't feel an urge prior, doesn't feel that she can control it at all  No history of other motor or vocal ticks  No family history of similar problems  Might happen more in setting of stress/anxiety  She thinks she gets enough sleep  Current AEDs:  Topiramate 150 mg twice daily  Medication side effects: None  Medication adherence: Yes    Event/Seizure semiology:  1  GTC seizure  None for 4-5 years  2  Aura involving black and white vision, tunnel vision, racing heart sensation may occur  Precedes #1 by 30 seconds or may rarely occur without progression  None for 4-5 years  Special Features  Status epilepticus: no  Self Injury Seizures: yes - falls  Precipitating Factors: none    Epilepsy Risk Factors: Told by Dr Sylvia Desai that she has a lesion in her brain, details uncertain  Normal birth and development  No history of seizures as a child  No family history of seizures  No head trauma resulting in loss of consciousness  No history of stroke or CNS infection       Prior AEDs:  Carbamazepine  Lamotrigine ineffective, forgetful  Levetiracetam ineffective  Pregabalin - couldnât feel feet  Vimpat  Thinks there were others, but does not recall    Prior Evaluation:  Was getting annual MRIs with Dr Yvette Bazzi, apparently to follow a lesion in the left temporal lobe  (records requested from [facility], no care everyhwere records in Epic)     Greater than 1 hour EEG: Mildly abnormal EEG in wakefulness and sleep due to the presence of two episodes in drowsiness of focal left temporal slowing (T3 maximal)  48 hour AEEG 11/9/2009 (Dr Allen Middlebury): The patient's noted numbing in face and headache were not associated with epileptiform activity  In comparison with a prior greater than one hour EEG read by myself on 9/30/2009 that study was mildly suggestive of a left temporal abnormality which was not clearly demonstrated during this study  REEG 3/30/2012 (Dr Yvette Bazzi): Normal awake and asleep  REEG 1/2/2013 (Dr Yvette Bazzi): bitemporal independent sharp waves  History Reviewed: The following were reviewed and updated as appropriate: allergies, current medications, past medical history, past social history and problem list    Psychiatric History:  None    Social History:   Driving: Yes  Lives Alone: No  Occupation: Realtor     ROS:  Review of Systems  Constitutional: Negative  Negative for appetite change and fever  HENT: Positive for congestion  Negative for hearing loss, tinnitus, trouble swallowing and voice change  Eyes: Negative  Negative for photophobia and pain  Respiratory: Negative  Negative for shortness of breath  Cardiovascular: Negative  Negative for palpitations  Gastrointestinal: Negative  Negative for nausea and vomiting  Endocrine: Negative  Negative for cold intolerance and heat intolerance  Genitourinary: Negative  Negative for dysuria, frequency and urgency  Musculoskeletal: Negative  Negative for myalgias and neck pain  Skin: Negative    Negative for rash  Neurological: Negative  Negative for dizziness, tremors, seizures, syncope, facial asymmetry, speech difficulty, weakness, light-headedness, numbness and headaches  Hematological: Negative  Does not bruise/bleed easily  Psychiatric/Behavioral: Negative  Negative for confusion, hallucinations and sleep disturbance  Objective    /74 (BP Location: Left arm, Patient Position: Sitting, Cuff Size: Adult)   Pulse 71   Ht 5' 9" (1 753 m)   Wt 95 kg (209 lb 8 oz)   BMI 30 94 kg/m²      General Exam  No acute distress  Neurologic Exam  Mental Status:  Alert and oriented x 3  Cranial Nerves: EOMI, VFFTC  Face symmetric with equal activation  Very slight twitching of the lower lids on the left and then right after prolonged activation of orbicularis oris and then brief spasm of orbicularis oris on right resulting in near eye closure  Endorses this tightening of muscles around the right eye as her typical event  No other associated symptoms/signs  Language: normal fluency and comprehension  Gait: Normal casual gait

## 2019-01-28 ENCOUNTER — HOSPITAL ENCOUNTER (OUTPATIENT)
Dept: RADIOLOGY | Facility: HOSPITAL | Age: 39
Discharge: HOME | End: 2019-01-28
Attending: PSYCHIATRY & NEUROLOGY
Payer: COMMERCIAL

## 2019-01-28 DIAGNOSIS — G40.409 TONIC-CLONIC EPILEPTIC SEIZURES (HCC): ICD-10-CM

## 2019-01-28 DIAGNOSIS — G24.8 CRANIOFACIAL DYSTONIA: ICD-10-CM

## 2019-01-28 DIAGNOSIS — G43.109 MIGRAINE WITH AURA AND WITHOUT STATUS MIGRAINOSUS, NOT INTRACTABLE: ICD-10-CM

## 2019-01-28 DIAGNOSIS — R90.89 ABNORMAL BRAIN MRI: ICD-10-CM

## 2019-01-28 PROCEDURE — 70553 MRI BRAIN STEM W/O & W/DYE: CPT

## 2019-01-28 PROCEDURE — A9585 GADOBUTROL INJECTION: HCPCS | Performed by: PSYCHIATRY & NEUROLOGY

## 2019-01-28 RX ADMIN — GADOBUTROL 9 ML: 604.72 INJECTION INTRAVENOUS at 18:02

## 2019-01-30 ENCOUNTER — TELEPHONE (OUTPATIENT)
Dept: NEUROLOGY | Facility: CLINIC | Age: 39
End: 2019-01-30

## 2019-01-30 NOTE — TELEPHONE ENCOUNTER
----- Message from Miguel Knox RN sent at 1/30/2019 11:52 AM EST -----      ----- Message -----  From: Yosef Golden MD  Sent: 1/30/2019  11:10 AM  To: Neurology [place] [facility]    Good news  Brain MRI is normal  Please notify patient

## 2019-01-30 NOTE — TELEPHONE ENCOUNTER
Called patient per Dr Dakota Gonzalez about her MRI of the brain which is  Normal  LVM for patient to call back if she has any questions

## 2019-07-12 ENCOUNTER — OFFICE VISIT (OUTPATIENT)
Dept: OBGYN CLINIC | Facility: CLINIC | Age: 39
End: 2019-07-12
Payer: COMMERCIAL

## 2019-07-12 VITALS
SYSTOLIC BLOOD PRESSURE: 122 MMHG | BODY MASS INDEX: 30.51 KG/M2 | HEIGHT: 69 IN | WEIGHT: 206 LBS | DIASTOLIC BLOOD PRESSURE: 76 MMHG

## 2019-07-12 DIAGNOSIS — T19.2XXA RETAINED TAMPON, INITIAL ENCOUNTER: Primary | ICD-10-CM

## 2019-07-12 PROCEDURE — 99213 OFFICE O/P EST LOW 20 MIN: CPT | Performed by: NURSE PRACTITIONER

## 2019-07-12 RX ORDER — METRONIDAZOLE 500 MG/1
500 TABLET ORAL EVERY 12 HOURS SCHEDULED
Qty: 14 TABLET | Refills: 0 | Status: SHIPPED | OUTPATIENT
Start: 2019-07-12 | End: 2019-07-19

## 2019-07-12 NOTE — PROGRESS NOTES
Sancho Maurer 46year-old , with 2 prior  sections and tubal ligation  She presents with complaint of menstrual problem  Menses are normally regular and predictable, lasting 4-5 days  This cycle started  and has been unlike her normal cycles  The flow has been light- brownish discharge  There is a foul odor  Has been ongoing for 14 days  Denies fevers, chills  ROS:  As indicated in HPI  All other ROS negative  Physical Exam   Constitutional: Vital signs are normal  She appears well-developed and well-nourished  Genitourinary: Pelvic exam was performed with patient supine  There is no rash, tenderness, lesion or injury on the right labia  There is no rash, tenderness, lesion or injury on the left labia  Genitourinary Comments: Retained tampon identified in vagina, removed  Malodorous  HENT:   Head: Normocephalic and atraumatic  Neck: Neck supple  Neurological: She is alert  Skin: Skin is warm  Nursing note and vitals reviewed  Emoliana Rascon was seen today for menstrual problem  Diagnoses and all orders for this visit:    Retained tampon, initial encounter  -     metroNIDAZOLE (FLAGYL) 500 mg tablet; Take 1 tablet (500 mg total) by mouth every 12 (twelve) hours for 7 days      Patient informed of exam findings  Placed on Flagyl

## 2019-12-13 DIAGNOSIS — G40.409 TONIC-CLONIC EPILEPTIC SEIZURES (HCC): ICD-10-CM

## 2019-12-13 DIAGNOSIS — G43.109 MIGRAINE WITH AURA AND WITHOUT STATUS MIGRAINOSUS, NOT INTRACTABLE: ICD-10-CM

## 2019-12-13 RX ORDER — TOPIRAMATE 50 MG/1
TABLET, FILM COATED ORAL
Qty: 540 TABLET | Refills: 0 | Status: SHIPPED | OUTPATIENT
Start: 2019-12-13 | End: 2020-09-08 | Stop reason: SDUPTHER

## 2019-12-18 ENCOUNTER — ANNUAL EXAM (OUTPATIENT)
Dept: OBGYN CLINIC | Facility: CLINIC | Age: 39
End: 2019-12-18
Payer: COMMERCIAL

## 2019-12-18 VITALS
SYSTOLIC BLOOD PRESSURE: 130 MMHG | BODY MASS INDEX: 32.5 KG/M2 | WEIGHT: 219.4 LBS | HEIGHT: 69 IN | DIASTOLIC BLOOD PRESSURE: 84 MMHG

## 2019-12-18 DIAGNOSIS — Z01.419 WOMEN'S ANNUAL ROUTINE GYNECOLOGICAL EXAMINATION: ICD-10-CM

## 2019-12-18 DIAGNOSIS — F41.9 ANXIETY: Primary | ICD-10-CM

## 2019-12-18 PROCEDURE — 99395 PREV VISIT EST AGE 18-39: CPT | Performed by: OBSTETRICS & GYNECOLOGY

## 2019-12-18 RX ORDER — SERTRALINE HYDROCHLORIDE 25 MG/1
25 TABLET, FILM COATED ORAL DAILY
Qty: 60 TABLET | Refills: 3 | Status: SHIPPED | OUTPATIENT
Start: 2019-12-18 | End: 2020-06-19 | Stop reason: ALTCHOICE

## 2019-12-18 NOTE — PROGRESS NOTES
Assessment/Plan:    Patient was of a stable gyn examination  A Pap smear was not performed  She is somewhat concerned about the health of her mother is recovering for ovarian cancer there is increased stress  We have decided to add a small dose of Zoloft daily routine  Return my office in 2 months for re-evaluation  Subjective:      Patient ID: Selena Banks is a 46 y o  female  HPI    This is a 58-year-old white female, she is a  2 para 2 with 2 prior  sections  Her last  section was accompanied with tubal ligation  That child is now 1years of age  Her menstrual cycles are regular and predictable  She does get some increasing cramping and ovulation and menstrual cycles  We talked about using nonsteroidal anti-inflammatory agents prior to the onset of ovulation or menstrual cycles  There is no problem with intimacy  She is not happy with her weight  Denies any major  GI complaint  She has a dentist on a regular basis  She does have increasing is anxiety and stress the present time she is concerned about her mother was recently diagnosed with ovarian cancer in has done well with debulking surgery and chemotherapy  She is now requesting some medication to help with the anxiety  We will consider Xanax  She may need something stronger  The following portions of the patient's history were reviewed and updated as appropriate: allergies, current medications, past family history, past medical history, past social history, past surgical history and problem list     Review of Systems   All other systems reviewed and are negative  Objective:      /84   Ht 5' 9" (1 753 m)   Wt 99 5 kg (219 lb 6 4 oz)   LMP 2019 (Exact Date)   BMI 32 40 kg/m²          Physical Exam   Constitutional: She appears well-developed and well-nourished  HENT:   Head: Normocephalic  Eyes: EOM are normal    Neck: Normal range of motion  Neck supple  No JVD present   No tracheal deviation present  No thyromegaly present  Cardiovascular: Normal rate, regular rhythm and normal heart sounds  Exam reveals no gallop and no friction rub  No murmur heard  Pulmonary/Chest: Effort normal and breath sounds normal  No stridor  No respiratory distress  She has no wheezes  She has no rales  She exhibits no tenderness  Right breast exhibits no inverted nipple, no mass, no nipple discharge, no skin change and no tenderness  Left breast exhibits no inverted nipple, no mass, no nipple discharge, no skin change and no tenderness  No breast swelling, tenderness, discharge or bleeding  Breasts are symmetrical    Abdominal: Soft  Bowel sounds are normal  She exhibits no distension and no mass  There is no tenderness  There is no rebound and no guarding  No hernia  Hernia confirmed negative in the right inguinal area and confirmed negative in the left inguinal area  Genitourinary: Rectum normal, vagina normal and uterus normal  No labial fusion  There is no rash, tenderness, lesion or injury on the right labia  There is no rash, tenderness, lesion or injury on the left labia  Uterus is not deviated, not enlarged, not fixed and not tender  Cervix exhibits no motion tenderness, no discharge and no friability  Right adnexum displays no mass, no tenderness and no fullness  Left adnexum displays no mass, no tenderness and no fullness  No erythema, tenderness or bleeding in the vagina  No foreign body in the vagina  No signs of injury around the vagina  No vaginal discharge found  Genitourinary Comments: The pelvic exam is completely benign the cervix uterus are well supported normal size is no prolapse adnexa clear bilaterally  A Pap smear was not performed  Musculoskeletal: Normal range of motion  Lymphadenopathy:     She has no cervical adenopathy  No inguinal adenopathy noted on the right or left side  Neurological: She is alert  Skin: Skin is warm and dry     Psychiatric: She has a normal mood and affect   Her behavior is normal    Increased anxiety secondary to health of her mother

## 2019-12-18 NOTE — PATIENT INSTRUCTIONS
The patient was informed of a stable gyn examination  She does have some anxiety with health of her mother  Will start on Zoloft  Return my office in 2 months for re-evaluation  She is happy with tubal ligation

## 2020-06-18 ENCOUNTER — OFFICE VISIT (OUTPATIENT)
Dept: OBGYN CLINIC | Facility: CLINIC | Age: 40
End: 2020-06-18
Payer: COMMERCIAL

## 2020-06-18 VITALS
WEIGHT: 214 LBS | HEIGHT: 68 IN | DIASTOLIC BLOOD PRESSURE: 84 MMHG | SYSTOLIC BLOOD PRESSURE: 126 MMHG | BODY MASS INDEX: 32.43 KG/M2 | TEMPERATURE: 99.5 F

## 2020-06-18 DIAGNOSIS — N92.0 MENORRHAGIA WITH REGULAR CYCLE: Primary | ICD-10-CM

## 2020-06-18 DIAGNOSIS — N39.3 STRESS INCONTINENCE IN FEMALE: ICD-10-CM

## 2020-06-18 PROCEDURE — 99213 OFFICE O/P EST LOW 20 MIN: CPT | Performed by: NURSE PRACTITIONER

## 2020-06-18 RX ORDER — TRANEXAMIC ACID 650 1/1
2 TABLET ORAL
Qty: 30 TABLET | Refills: 1 | Status: SHIPPED | OUTPATIENT
Start: 2020-06-18 | End: 2020-06-23

## 2020-06-19 ENCOUNTER — TELEPHONE (OUTPATIENT)
Dept: OBGYN CLINIC | Facility: CLINIC | Age: 40
End: 2020-06-19

## 2020-06-19 DIAGNOSIS — F43.21 SITUATIONAL DEPRESSION: Primary | ICD-10-CM

## 2020-06-19 RX ORDER — BUPROPION HYDROCHLORIDE 150 MG/1
150 TABLET ORAL EVERY MORNING
Qty: 30 TABLET | Refills: 0 | Status: SHIPPED | OUTPATIENT
Start: 2020-06-19 | End: 2020-07-23 | Stop reason: SDUPTHER

## 2020-06-23 ENCOUNTER — HOSPITAL ENCOUNTER (OUTPATIENT)
Dept: RADIOLOGY | Age: 40
Discharge: HOME | End: 2020-06-23
Payer: COMMERCIAL

## 2020-06-23 DIAGNOSIS — N92.0 MENORRHAGIA WITH REGULAR CYCLE: ICD-10-CM

## 2020-06-23 PROCEDURE — 76830 TRANSVAGINAL US NON-OB: CPT

## 2020-06-23 PROCEDURE — 76856 US EXAM PELVIC COMPLETE: CPT

## 2020-06-26 ENCOUNTER — TELEPHONE (OUTPATIENT)
Dept: OBGYN CLINIC | Facility: CLINIC | Age: 40
End: 2020-06-26

## 2020-07-23 ENCOUNTER — TELEPHONE (OUTPATIENT)
Dept: OBGYN CLINIC | Facility: CLINIC | Age: 40
End: 2020-07-23

## 2020-07-23 DIAGNOSIS — F43.21 SITUATIONAL DEPRESSION: ICD-10-CM

## 2020-07-23 RX ORDER — BUPROPION HYDROCHLORIDE 150 MG/1
150 TABLET ORAL EVERY MORNING
Qty: 90 TABLET | Refills: 1 | Status: SHIPPED | OUTPATIENT
Start: 2020-07-23 | End: 2020-09-08 | Stop reason: ALTCHOICE

## 2020-07-23 NOTE — TELEPHONE ENCOUNTER
Patient is requesting a refill of wellbutrin, she only had a 30 day supply    Are you able to refill or does she need to be seen for an appt

## 2020-07-23 NOTE — TELEPHONE ENCOUNTER
Called pt and pt reports that she went to the ER at [facility] on Wednesday due to abd pain.  She reports that the ER MD told her she was impacted up high and she was full of stool.  Pt has drank 2 bottles of mag citrate, and a 225g miralax with gatorade. Pt reports that she is only passing brown water. Pt reports she has not passed any stool.  Pt states she feels bad and her abd is hurting.  Advised pt will send message to Dr Alas and in the meantime if her symptoms continue to go back to ER.  Pt verb understanding.    Patient scheduled virtual visit for next week

## 2020-07-27 ENCOUNTER — TELEMEDICINE (OUTPATIENT)
Dept: OBGYN CLINIC | Facility: CLINIC | Age: 40
End: 2020-07-27
Payer: COMMERCIAL

## 2020-07-27 DIAGNOSIS — F41.9 ANXIETY: Primary | ICD-10-CM

## 2020-07-27 PROCEDURE — 99212 OFFICE O/P EST SF 10 MIN: CPT | Performed by: NURSE PRACTITIONER

## 2020-07-27 NOTE — PROGRESS NOTES
Virtual Regular Visit      Assessment/Plan:    Problem List Items Addressed This Visit     None      Visit Diagnoses     Anxiety    -  Primary          Reason for visit is follow-up of medication adjustment from Zoloft to Wellbutrin for her anxiety    Chief Complaint   Patient presents with    Virtual Regular Visit        Encounter provider Omero Hernandez, [facility]    Provider located at [address] [place] [id number] [phone]      Recent Visits  Date Type Provider Dept   20 Telephone Pily [facility]   Showing recent visits within past 7 days and meeting all other requirements     Future Appointments  No visits were found meeting these conditions  Showing future appointments within next 150 days and meeting all other requirements        The patient was identified by name and date of birth  Nataliia Corley was informed that this is a telemedicine visit and that the visit is being conducted through [employer]  My office door was closed  No one else was in the room  She acknowledged consent and understanding of privacy and security of the video platform  The patient has agreed to participate and understands they can discontinue the visit at any time  Patient is aware this is a billable service  Adrián Nath is a 45 y o  female 78-year-old   In  we changed her anti-depressant from Zoloft to Wellbutrin because the Zoloft was making her tired  She states she does not feel tired on the wellbutrin and her mood is stable  She wishes to continue on this medication  We will follow-up in 6 months in the office  A 6 month prescription was sent to her pharmacy         HPI     Past Medical History:   Diagnosis Date    Female infertility     Migraine     Seizures ([address] )     last seizures 5  years ago    Varicella     childhood       Past Surgical History:   Procedure Laterality Date    BUNIONECTOMY       SECTION      GA  DELIVERY ONLY N/A 2016    Procedure:  SECTION () REPEAT;  Surgeon: Nette Waldrop MD;  Location: [facility] ;  Service: Obstetrics    GA LIGATE FALLOPIAN TUBE Bilateral 2016    Procedure: LIGATION/COAGULATION TUBAL;  Surgeon: eNtte Waldrop MD;  Location[facility];  Service: Obstetrics       Current Outpatient Medications   Medication Sig Dispense Refill    B Complex Vitamins (B COMPLEX PO) Take 1 tablet by mouth daily      buPROPion (WELLBUTRIN XL) 150 mg 24 hr tablet Take 1 tablet (150 mg total) by mouth every morning 90 tablet 1    folic acid (FOLVITE) 450 mcg tablet Take 400 mcg by mouth daily   Multiple Vitamin tablet Take by mouth      Prenatal Vit-Fe Fumarate-FA (PRENATAL VITAMIN PO) Take 1 tablet by mouth daily   topiramate (TOPAMAX) 50 MG tablet TAKE 3 TABLETS BY MOUTH TWICE DAILY 540 tablet 0     No current facility-administered medications for this visit  Allergies   Allergen Reactions    Nuts Edema and Throat Swelling     Annotation - [id number]: treenuts    Sulfa Antibiotics GI Intolerance and Seizures       Review of Systems    Video Exam  No vital signs availabe    There were no vitals filed for this visit  Physical Exam   Constitutional: She is oriented to person, place, and time  She appears well-developed and well-nourished  Appears in no acute distress  Neurological: She is alert and oriented to person, place, and time  Psychiatric: She has a normal mood and affect  Her speech is normal and behavior is normal  Judgment and thought content normal         I spent 10 minutes directly with the patient during this visit      Antonia Mclaughlin acknowledges that she has consented to an online visit or consultation   She understands that the online visit is based solely on information provided by her, and that, in the absence of a face-to-face physical evaluation by the physician, the diagnosis she receives is both limited and provisional in terms of accuracy and completeness  This is not intended to replace a full medical face-to-face evaluation by the physician  Casi Juarez understands and accepts these terms

## 2020-09-08 ENCOUNTER — OFFICE VISIT (OUTPATIENT)
Dept: NEUROLOGY | Facility: CLINIC | Age: 40
End: 2020-09-08
Payer: COMMERCIAL

## 2020-09-08 VITALS
WEIGHT: 213.2 LBS | HEIGHT: 68 IN | HEART RATE: 71 BPM | BODY MASS INDEX: 32.31 KG/M2 | DIASTOLIC BLOOD PRESSURE: 82 MMHG | SYSTOLIC BLOOD PRESSURE: 120 MMHG | TEMPERATURE: 97.6 F

## 2020-09-08 DIAGNOSIS — G40.409 TONIC-CLONIC EPILEPTIC SEIZURES (HCC): Primary | ICD-10-CM

## 2020-09-08 DIAGNOSIS — G43.109 MIGRAINE WITH AURA AND WITHOUT STATUS MIGRAINOSUS, NOT INTRACTABLE: ICD-10-CM

## 2020-09-08 PROCEDURE — 99213 OFFICE O/P EST LOW 20 MIN: CPT | Performed by: PSYCHIATRY & NEUROLOGY

## 2020-09-08 RX ORDER — TOPIRAMATE 50 MG/1
150 TABLET, FILM COATED ORAL 2 TIMES DAILY
Qty: 540 TABLET | Refills: 3 | Status: SHIPPED | OUTPATIENT
Start: 2020-09-08 | End: 2021-09-15 | Stop reason: SDUPTHER

## 2020-09-08 NOTE — PATIENT INSTRUCTIONS
Continue current seizure medications unchanged  Let us know if there are seizures or problems with your medication  Return in one year

## 2020-09-08 NOTE — PROGRESS NOTES
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 with seizure freedom for about 7 years while on monotherapy with topiramate  Her neurological exam is normal  There are no clear seizure risk factors  EEG data (temporal slowing and possible temporal discharges, not personally reviewed) and semiology suggest focal epilepsy       Migraine controlled  Continue topiramate 150 mg bid  Patient Instructions   Continue current seizure medications unchanged  Let us know if there are seizures or problems with your medication  Return in one year  Diagnoses and all orders for this visit:    Tonic-clonic epileptic seizures ([address] )  -     Topiramate level; Future  -     topiramate (TOPAMAX) 50 MG tablet; Take 3 tablets (150 mg total) by mouth 2 (two) times a day    Migraine with aura and without status migrainosus, not intractable  -     topiramate (TOPAMAX) 50 MG tablet; Take 3 tablets (150 mg total) by mouth 2 (two) times a day        Gloria PAGE Odilia Velasco is returning to the [facility] for follow up  Interval Events:   Seizures since last visit: None  Hospitalizations: no    No events concerning for seizure  No mood problems  No other specific concerns  She was grieving a family member's illness/loss and was prescribed Wellbutrin, but she did not started and feels there is no significant depression/anxiety right now  Right eyelid twitching has improved with new glasses  Current AEDs:  Topiramate 150 mg bid  Medication side effects: None  Medication adherence: May miss a dose 1-2 times per month  Event/Seizure semiology:  1  GTC seizure  None for 4-5 years  2  Aura involving black and white vision, tunnel vision, racing heart sensation may occur  Precedes #1 by 30 seconds or may rarely occur without progression  None for 4-5 years     Special Features  Status epilepticus: no  Self Injury Seizures: yes - falls  Precipitating Factors: none     Epilepsy Risk Factors: Told by Dr Amrit Zuñiga that she has a lesion in her brain, details uncertain  Normal birth and development  No history of seizures as a child  No family history of seizures  No head trauma resulting in loss of consciousness  No history of stroke or CNS infection       Prior AEDs:  Carbamazepine  Lamotrigine ineffective, forgetful  Levetiracetam ineffective  Pregabalin - couldnât feel feet  Vimpat  Thinks there were others, but does not recall     Prior Evaluation:  Was getting annual MRIs with Dr Amrit Zuñiga, apparently to follow a lesion in the left temporal lobe  (records requested from [facility],  care everyhwere records in Epic)     Greater than 1 hour EEG: Mildly abnormal EEG in wakefulness and sleep due to the presence of two episodes in drowsiness of focal left temporal slowing (T3 maximal)  48 hour AEEG 11/9/2009 (Dr Abby Wong): The patient's noted numbing in face and headache were not associated with epileptiform activity  In comparison with a prior greater than one hour EEG read by myself on 9/30/2009 that study was mildly suggestive of a left temporal abnormality which was not clearly demonstrated during this study  REEG 3/30/2012 (Dr Amrit Zuñiga): Normal awake and asleep  REEG 1/2/2013 (Dr Amrit Zuñiga): bitemporal independent sharp waves      History Reviewed: The following were reviewed and updated as appropriate: allergies, current medications, past medical history, past social history and problem list     Psychiatric History:  None     Social History:   Driving: Yes  Lives Alone: No  Occupation: Realtor           Objective    /82 (BP Location: Left arm, Patient Position: Sitting, Cuff Size: Standard)   Pulse 71   Temp 97 6 °F (36 4 °C)   Ht 5' 8" (1 727 m)   Wt 96 7 kg (213 lb 3 2 oz)   BMI 32 42 kg/m²      General Exam  No acute distress      Neurologic Exam  Mental Status:  Alert and oriented  Language: normal fluency and comprehension  Cranial Nerves:  VFFTC  EOMI, no nystagums  Face symmetric with equal activation  No dysarthria  Motor:  No drift  5/5 distally in the arms  Coordination: Finger to nose intact  Gait: Normal casual gait  Review of Systems   Constitutional: Negative  Negative for appetite change and fever  HENT: Negative  Negative for hearing loss, tinnitus, trouble swallowing and voice change  Eyes: Negative  Negative for photophobia and pain  Respiratory: Negative  Negative for shortness of breath  Cardiovascular: Negative  Negative for palpitations  Gastrointestinal: Negative  Negative for nausea and vomiting  Endocrine: Negative  Negative for cold intolerance  Genitourinary: Negative  Negative for dysuria, frequency and urgency  Musculoskeletal: Negative  Negative for myalgias and neck pain  Skin: Negative  Negative for rash  Neurological: Negative  Negative for dizziness, tremors, seizures, syncope, facial asymmetry, speech difficulty, weakness, light-headedness, numbness and headaches  Hematological: Negative  Does not bruise/bleed easily  Psychiatric/Behavioral: Negative  Negative for confusion, hallucinations and sleep disturbance

## 2021-04-14 ENCOUNTER — OFFICE VISIT (OUTPATIENT)
Dept: FAMILY MEDICINE CLINIC | Facility: CLINIC | Age: 41
End: 2021-04-14
Payer: COMMERCIAL

## 2021-04-14 VITALS
SYSTOLIC BLOOD PRESSURE: 114 MMHG | TEMPERATURE: 98.4 F | HEART RATE: 79 BPM | OXYGEN SATURATION: 99 % | RESPIRATION RATE: 18 BRPM | BODY MASS INDEX: 32.4 KG/M2 | HEIGHT: 68 IN | WEIGHT: 213.8 LBS | DIASTOLIC BLOOD PRESSURE: 72 MMHG

## 2021-04-14 DIAGNOSIS — Z00.00 ANNUAL PHYSICAL EXAM: Primary | ICD-10-CM

## 2021-04-14 DIAGNOSIS — E66.9 CLASS 1 OBESITY WITHOUT SERIOUS COMORBIDITY WITH BODY MASS INDEX (BMI) OF 32.0 TO 32.9 IN ADULT, UNSPECIFIED OBESITY TYPE: ICD-10-CM

## 2021-04-14 DIAGNOSIS — M79.10 MYALGIA: ICD-10-CM

## 2021-04-14 DIAGNOSIS — F43.22 ADJUSTMENT DISORDER WITH ANXIETY: ICD-10-CM

## 2021-04-14 DIAGNOSIS — Z13.6 SCREENING FOR CARDIOVASCULAR CONDITION: ICD-10-CM

## 2021-04-14 DIAGNOSIS — Z13.1 SCREENING FOR DIABETES MELLITUS: ICD-10-CM

## 2021-04-14 DIAGNOSIS — G43.109 MIGRAINE WITH AURA AND WITHOUT STATUS MIGRAINOSUS, NOT INTRACTABLE: ICD-10-CM

## 2021-04-14 DIAGNOSIS — G40.409 TONIC-CLONIC EPILEPTIC SEIZURES (HCC): ICD-10-CM

## 2021-04-14 DIAGNOSIS — E66.9 OBESITY (BMI 30.0-34.9): ICD-10-CM

## 2021-04-14 PROBLEM — Z80.41 FAMILY HISTORY OF OVARIAN CANCER: Status: ACTIVE | Noted: 2021-04-14

## 2021-04-14 PROBLEM — E66.811 CLASS 1 OBESITY WITHOUT SERIOUS COMORBIDITY WITH BODY MASS INDEX (BMI) OF 32.0 TO 32.9 IN ADULT: Status: ACTIVE | Noted: 2021-04-14

## 2021-04-14 PROCEDURE — 3725F SCREEN DEPRESSION PERFORMED: CPT | Performed by: FAMILY MEDICINE

## 2021-04-14 PROCEDURE — 3008F BODY MASS INDEX DOCD: CPT | Performed by: FAMILY MEDICINE

## 2021-04-14 PROCEDURE — 1036F TOBACCO NON-USER: CPT | Performed by: FAMILY MEDICINE

## 2021-04-14 PROCEDURE — 99385 PREV VISIT NEW AGE 18-39: CPT | Performed by: FAMILY MEDICINE

## 2021-04-14 NOTE — ASSESSMENT & PLAN NOTE
Stable  Pending labs  Recommend lifestyle modifications  To consider sleep study PRN? Patient is currently seen a nutritionist and I advised that her regimen may need to be altered if her labs are normal    On Topamax per Neurology for the treatment of epilepsy and migraines

## 2021-04-14 NOTE — ASSESSMENT & PLAN NOTE
As patient's mother already had genetic testing through Gynecology Oncology and no further testing was recommended for her mother's children, I discussed that patient would likely not have much different outcome from a personal surgical Oncology consult for the same regarding genetics  She will discuss utility of endometrial biopsy due to abnormal uterine bleeding with her gynecologist    She will also contact her insurance to see if a screening colonoscopy would be covered at her age

## 2021-04-14 NOTE — ASSESSMENT & PLAN NOTE
Stable  Patient declines mood medications and counseling at this time  Smart phone herb list and counseling herb list provided today

## 2021-04-14 NOTE — PATIENT INSTRUCTIONS
Please contact your insurance if you are uncertain of coverage for plan of care items  Your insurance may not cover the cost of your Vitamin D blood test, which is approximately $65-70  Please notify the lab prior to blood draw if you would like to decline this test       Abisai Guan, -       Thank you for notifying us regarding your interest in the Covid-19 vaccine  At this time, until vaccine supply ramps up, [facility] is currently only scheduling vaccines for healthcare workers, first responders and individuals ages 76 and older to ensure we have adequate vaccine supply for these high-risk groups  In an effort to give you the tools to help you, your family, and loved ones who are asking for this information, and/or seeking assistance, we highly encourage you to take either step below to help pre-register for the Covid-19 vaccine:     1  If you do not have a [employer] account, visit [email or web address] and sign-up, and then complete the brief questionnaire within [address]  Anyone, of any age, may pre-register on [employer]  OR    2  If you are unable to use [employer], please call [phone], option [phone], and follow the prompts  Note: Due to high call volumes, we are hoping to reserve the use of the call-in option for individuals 75+ at this time  Please encourage your loved ones to use [employer]  Need further assistance? Visit:  Fanny cooper       Thank you,       [facility] Visit for Adults   AMBULATORY CARE:   A wellness visit  is when you see your healthcare provider to get screened for health problems  Your healthcare provider will also give you advice on how to stay healthy  Write down your questions so you remember to ask them  Ask your healthcare provider how often you should have a wellness visit    What happens at a wellness visit:  Your healthcare provider will ask about your health, and your family history of health problems  This includes high blood pressure, heart disease, and cancer  He or she will ask if you have symptoms that concern you, if you smoke, and about your mood  You may also be asked about your intake of medicines, supplements, food, and alcohol  Any of the following may be done:  · Your weight  will be checked  Your height may also be checked so your body mass index (BMI) can be calculated  Your BMI shows if you are at a healthy weight  · Your blood pressure  and heart rate will be checked  Your temperature may also be checked  · Blood and urine tests  may be done  Blood tests may be done to check your cholesterol levels  Abnormal cholesterol levels increase your risk for heart disease and stroke  You may also need a blood or urine test to check for diabetes if you are at increased risk  Urine tests may be done to look for signs of an infection or kidney disease  · A physical exam  includes checking your heartbeat and lungs with a stethoscope  Your healthcare provider may also check your skin to look for sun damage  · Screening tests  may be recommended  A screening test is done to check for diseases that may not cause symptoms  The screening tests you may need depend on your age, gender, family history, and lifestyle habits  For example, colorectal screening may be recommended if you are 48years old or older  Screening tests you need if you are a woman:   · A Pap smear  is used to screen for cervical cancer  Pap smears are usually done every 3 to 5 years depending on your age  You may need them more often if you have had abnormal Pap smear test results in the past  Ask your healthcare provider how often you should have a Pap smear  · A mammogram  is an x-ray of your breasts to screen for breast cancer  Experts recommend mammograms every 2 years starting at age 48 years  You may need a mammogram at age 52 years or younger if you have an increased risk for breast cancer   Talk to your healthcare provider about when you should start having mammograms and how often you need them  Vaccines you may need:   · Get an influenza vaccine  every year  The influenza vaccine protects you from the flu  Several types of viruses cause the flu  The viruses change over time, so new vaccines are made each year  · Get a tetanus-diphtheria (Td) booster vaccine  every 10 years  This vaccine protects you against tetanus and diphtheria  Tetanus is a severe infection that may cause painful muscle spasms and lockjaw  Diphtheria is a severe bacterial infection that causes a thick covering in the back of your mouth and throat  · Get a human papillomavirus (HPV) vaccine  if you are female and aged 23 to 32 or male 23 to 24 and never received it  This vaccine protects you from HPV infection  HPV is the most common infection spread by sexual contact  HPV may also cause vaginal, penile, and anal cancers  · Get a pneumococcal vaccine  if you are aged 72 years or older  The pneumococcal vaccine is an injection given to protect you from pneumococcal disease  Pneumococcal disease is an infection caused by pneumococcal bacteria  The infection may cause pneumonia, meningitis, or an ear infection  · Get a shingles vaccine  if you are 60 or older, even if you have had shingles before  The shingles vaccine is an injection to protect you from the varicella-zoster virus  This is the same virus that causes chickenpox  Shingles is a painful rash that develops in people who had chickenpox or have been exposed to the virus  How to eat healthy:  My Plate is a model for planning healthy meals  It shows the types and amounts of foods that should go on your plate  Fruits and vegetables make up about half of your plate, and grains and protein make up the other half  A serving of dairy is included on the side of your plate  The amount of calories and serving sizes you need depends on your age, gender, weight, and height  Examples of healthy foods are listed below:  · Eat a variety of vegetables  such as dark green, red, and orange vegetables  You can also include canned vegetables low in sodium (salt) and frozen vegetables without added butter or sauces  · Eat a variety of fresh fruits , canned fruit in 100% juice, frozen fruit, and dried fruit  · Include whole grains  At least half of the grains you eat should be whole grains  Examples include whole-wheat bread, wheat pasta, brown rice, and whole-grain cereals such as oatmeal     · Eat a variety of protein foods such as seafood (fish and shellfish), lean meat, and poultry without skin (turkey and chicken)  Examples of lean meats include pork leg, shoulder, or tenderloin, and beef round, sirloin, tenderloin, and extra lean ground beef  Other protein foods include eggs and egg substitutes, beans, peas, soy products, nuts, and seeds  · Choose low-fat dairy products such as skim or 1% milk or low-fat yogurt, cheese, and cottage cheese  · Limit unhealthy fats  such as butter, hard margarine, and shortening  Exercise:  Exercise at least 30 minutes per day on most days of the week  Some examples of exercise include walking, biking, dancing, and swimming  You can also fit in more physical activity by taking the stairs instead of the elevator or parking farther away from stores  Include muscle strengthening activities 2 days each week  Regular exercise provides many health benefits  It helps you manage your weight, and decreases your risk for type 2 diabetes, heart disease, stroke, and high blood pressure  Exercise can also help improve your mood  Ask your healthcare provider about the best exercise plan for you  General health and safety guidelines:   · Do not smoke  Nicotine and other chemicals in cigarettes and cigars can cause lung damage  Ask your healthcare provider for information if you currently smoke and need help to quit   E-cigarettes or smokeless tobacco still contain nicotine  Talk to your healthcare provider before you use these products  · Limit alcohol  A drink of alcohol is 12 ounces of beer, 5 ounces of wine, or 1½ ounces of liquor  · Lose weight, if needed  Being overweight increases your risk of certain health conditions  These include heart disease, high blood pressure, type 2 diabetes, and certain types of cancer  · Protect your skin  Do not sunbathe or use tanning beds  Use sunscreen with a SPF 15 or higher  Apply sunscreen at least 15 minutes before you go outside  Reapply sunscreen every 2 hours  Wear protective clothing, hats, and sunglasses when you are outside  · Drive safely  Always wear your seatbelt  Make sure everyone in your car wears a seatbelt  A seatbelt can save your life if you are in an accident  Do not use your cell phone when you are driving  This could distract you and cause an accident  Pull over if you need to make a call or send a text message  · Practice safe sex  Use latex condoms if are sexually active and have more than one partner  Your healthcare provider may recommend screening tests for sexually transmitted infections (STIs)  · Wear helmets, lifejackets, and protective gear  Always wear a helmet when you ride a bike or motorcycle, go skiing, or play sports that could cause a head injury  Wear protective equipment when you play sports  Wear a lifejacket when you are on a boat or doing water sports  © Copyright [address] Information is for End User's use only and may not be sold, redistributed or otherwise used for commercial purposes  All illustrations and images included in CareNotes® are the copyrighted property of [employer]  or [facility] Wen Sargent   The above information is an  only  It is not intended as medical advice for individual conditions or treatments   Talk to your doctor, nurse or pharmacist before following any medical regimen to see if it is safe and effective for you  Obesity   AMBULATORY CARE:   Obesity  is when your body mass index (BMI) is greater than 30  Your healthcare provider will use your height and weight to measure your BMI  The risks of obesity include  many health problems, such as injuries or physical disability  You may need tests to check for the following:  · Diabetes    · High blood pressure or high cholesterol    · Heart disease    · Gallbladder or liver disease    · Cancer of the colon, breast, prostate, liver, or kidney    · Sleep apnea    · Arthritis or gout    Seek care immediately if:   · You have a severe headache, confusion, or difficulty speaking  · You have weakness on one side of your body  · You have chest pain, sweating, or shortness of breath  Contact your healthcare provider if:   · You have symptoms of gallbladder or liver disease, such as pain in your upper abdomen  · You have knee or hip pain and discomfort while walking  · You have symptoms of diabetes, such as intense hunger and thirst, and frequent urination  · You have symptoms of sleep apnea, such as snoring or daytime sleepiness  · You have questions or concerns about your condition or care  Treatment for obesity  focuses on helping you lose weight to improve your health  Even a small decrease in BMI can reduce the risk for many health problems  Your healthcare provider will help you set a weight-loss goal   · Lifestyle changes  are the first step in treating obesity  These include making healthy food choices and getting regular physical activity  Your healthcare provider may suggest a weight-loss program that involves coaching, education, and therapy  · Medicine  may help you lose weight when it is used with a healthy diet and physical activity  · Surgery  can help you lose weight if you are very obese and have other health problems  There are several types of weight-loss surgery  Ask your healthcare provider for more information      Be successful losing weight:   · Set small, realistic goals  An example of a small goal is to walk for 20 minutes 5 days a week  Anther goal is to lose 5% of your body weight  · Tell friends, family members, and coworkers about your goals  and ask for their support  Ask a friend to lose weight with you, or join a weight-loss support group  · Identify foods or triggers that may cause you to overeat , and find ways to avoid them  Remove tempting high-calorie foods from your home and workplace  Place a bowl of fresh fruit on your kitchen counter  If stress causes you to eat, then find other ways to cope with stress  · Keep a diary to track what you eat and drink  Also write down how many minutes of physical activity you do each day  Weigh yourself once a week and record it in your diary  Eating changes: You will need to eat 500 to 1,000 fewer calories each day than you currently eat to lose 1 to 2 pounds a week  The following changes will help you cut calories:  · Eat smaller portions  Use small plates, no larger than 9 inches in diameter  Fill your plate half full of fruits and vegetables  Measure your food using measuring cups until you know what a serving size looks like  · Eat 3 meals and 1 or 2 snacks each day  Plan your meals in advance  Shoaib Dave and eat at home most of the time  Eat slowly  Do not skip meals  Skipping meals can lead to overeating later in the day  This can make it harder for you to lose weight  Talk with a dietitian to help you make a meal plan and schedule that is right for you  · Eat fruits and vegetables at every meal   They are low in calories and high in fiber, which makes you feel full  Do not add butter, margarine, or cream sauce to vegetables  Use herbs to season steamed vegetables  · Eat less fat and fewer fried foods  Eat more baked or grilled chicken and fish  These protein sources are lower in calories and fat than red meat  Limit fast food   Dress your salads with olive oil and vinegar instead of bottled dressing  · Limit the amount of sugar you eat  Do not drink sugary beverages  Limit alcohol  Activity changes:  Physical activity is good for your body in many ways  It helps you burn calories and build strong muscles  It decreases stress and depression, and improves your mood  It can also help you sleep better  Talk to your healthcare provider before you begin an exercise program   · Exercise for at least 30 minutes 5 days a week  Start slowly  Set aside time each day for physical activity that you enjoy and that is convenient for you  It is best to do both weight training and an activity that increases your heart rate, such as walking, bicycling, or swimming  · Find ways to be more active  Do yard work and housecleaning  Walk up the stairs instead of using elevators  Spend your leisure time going to events that require walking, such as outdoor festivals or fairs  This extra physical activity can help you lose weight and keep it off  Follow up with your healthcare provider as directed: You may need to meet with a dietitian  Write down your questions so you remember to ask them during your visits  © Copyright [address] [place] [address] Drive Information is for End User's use only and may not be sold, redistributed or otherwise used for commercial purposes  All illustrations and images included in CareNotes® are the copyrighted property of A D A M [employer] Inc  or [address] [place], [place] [address]ana maria   The above information is an  only  It is not intended as medical advice for individual conditions or treatments  Talk to your doctor, nurse or pharmacist before following any medical regimen to see if it is safe and effective for you  Cholesterol and Your Health   AMBULATORY CARE:   Cholesterol  is a waxy, fat-like substance  Your body uses cholesterol to make hormones and new cells, and to protect nerves  Cholesterol is made by your body   It also comes from certain foods you eat, such as meat and dairy products  Your healthcare provider can help you set goals for your cholesterol levels  He or she can help you create a plan to meet your goals  Cholesterol level goals: Your cholesterol level goals depend on your risk for heart disease, your age, and your other health conditions  The following are general guidelines:  · Total cholesterol  includes low-density lipoprotein (LDL), high-density lipoprotein (HDL), and triglyceride levels  The total cholesterol level should be lower than 200 mg/dL and is best at about 150 mg/dL  · LDL cholesterol  is called bad cholesterol  because it forms plaque in your arteries  As plaque builds up, your arteries become narrow, and less blood flows through  When plaque decreases blood flow to your heart, you may have chest pain  If plaque completely blocks an artery that brings blood to your heart, you may have a heart attack  Plaque can break off and form blood clots  Blood clots may block arteries in your brain and cause a stroke  The level should be less than 130 mg/dL and is best at about 100 mg/dL  · HDL cholesterol  is called good cholesterol  because it helps remove LDL cholesterol from your arteries  It does this by attaching to LDL cholesterol and carrying it to your liver  Your liver breaks down LDL cholesterol so your body can get rid of it  High levels of HDL cholesterol can help prevent a heart attack and stroke  Low levels of HDL cholesterol can increase your risk for heart disease, heart attack, and stroke  The level should be 60 mg/dL or higher  · Triglycerides  are a type of fat that store energy from foods you eat  High levels of triglycerides also cause plaque buildup  This can increase your risk for a heart attack or stroke  If your triglyceride level is high, your LDL cholesterol level may also be high  The level should be less than 150 mg/dL      What increases your risk for high cholesterol:   · Smoking cigarettes    · Being overweight or obese, or not getting enough exercise    · Drinking large amounts of alcohol    · A medical condition such as hypertension (high blood pressure) or diabetes    · Certain genes passed from your parents to you    · Age older than 65 years    What you need to know about having your cholesterol levels checked: Adults 21to 39years of age should have their cholesterol levels checked every 4 to 6 years  Adults 45 years or older should have their cholesterol checked every 1 to 2 years  You may need your cholesterol checked more often, or at a younger age, if you have risk factors for heart disease  You may also need to have your cholesterol checked more often if you have other health conditions, such as diabetes  Blood tests are used to check cholesterol levels  Blood tests measure your levels of triglycerides, LDL cholesterol, and HDL cholesterol  How healthy fats affect your cholesterol levels:  Healthy fats, also called unsaturated fats, help lower LDL cholesterol and triglyceride levels  Healthy fats include the following:  · Monounsaturated fats  are found in foods such as olive oil, canola oil, avocado, nuts, and olives  · Polyunsaturated fats,  such as omega 3 fats, are found in fish, such as salmon, trout, and tuna  They can also be found in plant foods such as flaxseed, walnuts, and soybeans  How unhealthy fats affect your cholesterol levels:  Unhealthy fats increase LDL cholesterol and triglyceride levels  They are found in foods high in cholesterol, saturated fat, and trans fat:  · Cholesterol  is found in eggs, dairy, and meat  · Saturated fat  is found in butter, cheese, ice cream, whole milk, and coconut oil  Saturated fat is also found in meat, such as sausage, hot dogs, and bologna  · Trans fat  is found in liquid oils and is used in fried and baked foods   Foods that contain trans fats include chips, crackers, muffins, sweet rolls, microwave popcorn, and cookies  Treatment  for high cholesterol will also decrease your risk of heart disease, heart attack, and stroke  Treatment may include any of the following:  · Lifestyle changes  may include food, exercise, weight loss, and quitting smoking  You may also need to decrease the amount of alcohol you drink  Your healthcare provider will want you to start with lifestyle changes  Other treatment may be added if lifestyle changes are not enough  · Medicines  may be given to lower your LDL cholesterol, triglyceride levels, or total cholesterol level  You may need medicines to lower your cholesterol if any of the following is true:     ? You have a history of stroke, TIA, unstable angina, or a heart attack  ? Your LDL cholesterol level is 190 mg/dL or higher  ? You are age 36 to 76 years, have diabetes or heart disease risk factors, and your LDL cholesterol is 70 mg/dL or higher  · Supplements  include fish oil, red yeast rice, and garlic  Fish oil may help lower your triglyceride and LDL cholesterol levels  It may also increase your HDL cholesterol level  Red yeast rice may help decrease your total cholesterol level and LDL cholesterol level  Garlic may help lower your total cholesterol level  Do not take these supplements without talking to your healthcare provider  Food changes you can make to lower your cholesterol levels:  A dietitian can help you create a healthy eating plan  He or she can show you how to read food labels and choose foods low in saturated fat, trans fats, and cholesterol  · Decrease the total amount of fat you eat  Choose lean meats, fat-free or 1% fat milk, and low-fat dairy products, such as yogurt and cheese  Try to limit or avoid red meats  Limit or do not eat fried foods or baked goods, such as cookies  · Replace unhealthy fats with healthy fats  Cook foods in olive oil or canola oil  Choose soft margarines that are low in saturated fat and trans fat   Seeds, nuts, and avocados are other examples of healthy fats  · Eat foods with omega-3 fats  Examples include salmon, tuna, mackerel, walnuts, and flaxseed  Eat fish 2 times per week  Pregnant women should not eat fish that have high levels of mercury, such as shark, swordfish, and meeta mackerel  · Increase the amount of high-fiber foods you eat  High-fiber foods can help lower your LDL cholesterol  Aim to get between 20 and 30 grams of fiber each day  Fruits and vegetables are high in fiber  Eat at least 5 servings each day  Other high-fiber foods are whole-grain or whole-wheat breads, pastas, or cereals, and brown rice  Eat 3 ounces of whole-grain foods each day  Increase fiber slowly  You may have abdominal discomfort, bloating, and gas if you add fiber to your diet too quickly  · Eat healthy protein foods  Examples include low-fat dairy products, skinless chicken and turkey, fish, and nuts  · Limit foods and drinks that are high in sugar  Your dietitian or healthcare provider can help you create daily limits for high-sugar foods and drinks  The limit may be lower if you have diabetes or another health condition  Limits can also help you lose weight if needed  Lifestyle changes you can make to lower your cholesterol levels:   · Maintain a healthy weight  Ask your healthcare provider what a healthy weight is for you  Ask him or her to help you create a weight loss plan if needed  Weight loss can decrease your total cholesterol and triglyceride levels  Weight loss may also help keep your blood pressure at a healthy level  · Exercise regularly  Exercise can help lower your total cholesterol level and maintain a healthy weight  Exercise can also help increase your HDL cholesterol level  Work with your healthcare provider to create an exercise program that is right for you  Get at least 30 to 40 minutes of moderate exercise most days of the week   Examples of exercise include brisk walking, swimming, or biking  · Do not smoke  Nicotine and other chemicals in cigarettes and cigars can raise your cholesterol levels  Ask your healthcare provider for information if you currently smoke and need help to quit  E-cigarettes or smokeless tobacco still contain nicotine  Talk to your healthcare provider before you use these products  · Limit or do not drink alcohol  Alcohol can increase your triglyceride levels  Ask your healthcare provider before you drink alcohol  Ask how much is okay for you to drink in 1 day or 1 week  © Copyright [address] Information is for End User's use only and may not be sold, redistributed or otherwise used for commercial purposes  All illustrations and images included in CareNotes® are the copyrighted property of A [employer] A M [employer]  or [employer]   The above information is an  only  It is not intended as medical advice for individual conditions or treatments  Talk to your doctor, nurse or pharmacist before following any medical regimen to see if it is safe and effective for you  Apps and Websites for Counseling, Anxiety/Depression, Chronic Pain, Lifestyle Change, Problem-solving, Self-Esteem, Anger Management, Coping with Uncertainty    (Prices current as of 9/5/19)    1  Mood Kit - Available in [employer] Store for $4 99  Supports a person's success in specific situations, includes thought , mood tracker, and journal   Can be accessed in an unstructured way and used as an unguided self-help claudy  2   Moodnotes - Available in [employer] Store for $4 99  Focuses on healthier thinking habits and identifying "traps" in thinking  Tracks mood over a period of time and identifies factors that influence mood  3   MoodMission - Available in [employer] for free  Includes five "missions" to promote confidence in handling stressors and coping in specific situations    The claudy personalizes its style and techniques based on when the user engages it most frequently  In-herb rewards are used to motivate, increase fun and self-confidence  Helpful for patients who need improvement in mood or a decrease in anxiety and depression symptoms  4    What's Up - Available in [employer] for free  Recognizes negative thinking patterns and methods to overcome them  Uses helpful metaphors, a catastrophe scale, grounding techniques, and breathing exercises  Has the ability to sync data across multiple devices and protect this information with a unique pass code  Has the capability to be active in forums where people can discuss similar feelings and strategies that have been helpful  5   Moodpath - Available in [employer]  Uses daily screenings to create a better understanding of thoughts, feelings, and emotions  When needed, it provides a discussion guide to talking with a medical professional based on the responses to daily screenings  Includes over 150 psychological exercises and videos to promote and strengthen overall mental health  6   MindShift - Available in [employer]  Helpful for youth and young adults in coping with anxiety  Creates an individualized toolbox to help users deal with test anxiety, perfectionism, social anxiety, worry, panic, and conflict  Includes directions on how to construct belief experiments to trial common beliefs that feed anxiety, guided relaxation, as well as tools and tips to help establish and accomplish goals  Differentiates between helpful and unhelpful anxiety, and explains how to overcome fears by gradually facing them in manageable steps  7   CBT-I  - Available in [employer]  For insomnia  Educates about sleep, developing positive sleep routines, and improved sleep environment    Encourages user to change behaviors which will provide confidence for better sleep on a regular basis  8   [employer] - Free website  Provides self-help and therapy resources that encourages change to combat negative and destructive thought patterns  Includes access to numerous handouts on a variety of symptoms related to anxiety, depression, low self-esteem, panic attacks, social disorders, and more  The solution section has interventions that can be printed and saved for future use  9   Woebot - Available in [employer] for free  Recommended for age 16+  Friendly self-care  that helps you think through situations with step-by-step guidance using counseling tools, learn about yourself with intelligent mood tracking, and master skills to reduce stress and live happier through 100+ evidence-based stories from clinicians  Chat as often or as little as you'd like, whenever you'd like to  10   Idalia LIZARRAGA  CBT DBT Chatbot - Available in Apple appsstore and Google Play for free, has in-claudy purchases  Recommended for 12+  Psychologist support at $30/month, 50% off to start  Kia Chan / Pilo Jaquez is free  Uses techniques of CBT, DBT, yoga, and meditation to support your needs regarding stress, anxiety, sleep, loss, and a full range of other mental health and wellness issues  11   Curable Pain Relief - Available in [employer] Claud[employer] store and Google Play for free, has in-claudy purchases  Teaches about the chronic pain cycle and how to reverse it, while retraining your brain and nervous system for long-term results  Smart  Heather guides user through updates in pain science to identify, target, eliminate the factors that are keeping user "stuck" in the pain cycle  12   [employer] Online Therapy - Available in [employer] Claud[employer] store and Google Play for a fee  Subscription service, starting at $59/week (billed monthly)  All plans include unlimited messaging, and add live video sessions if desired    Unlimited messaging therapy for teens ages 15-14 and special services for couples therapy  You can change therapists or stop subscription renewal at any time  Recommended for 13+  User is matched with a licensed therapist in your state and communicate on your device by text, audio, and video from anywhere, at any time  User will hear back at least once a day, 5 days per week  Refer to the back of insurance card for counseling options  Counseling services:    [facility]   [address], 939 [facility], [place], [address] (they have equine therapy too)  8 [facility], [place] [address],  [place], [address] [facility] [address], [address],  [place] [address], [address]    70 [facility], [address] [place] [address]   [phone]     [address] [place] [address]  200 [facility], [address]  [facility] 49    162 [facility]   [address], [facility], [address]     1200 [facility]   2102 [facility], [facility], [address]     Halina Saeed, MSW, LSW  (patients age 11-adult)   [address], 243 [facility], [address]   [facility], [facility]  Ne  1000 [facility], 1777 [facility] [address] [place] [address], Psychiatric,[address], [place], [address] (specializing in addiction)  [place], [place], [address]  1441 [facility]  [address], [place], 6100 [facility]   [place]      [address], 403 [facility] , [address], [place], [address], [place], [facility], 2121 [facility], [address], [place], [address]  [address] [address], [address] [place] [address], [facility]   [phone]    Frida Diop, [address], [place], [place] [address]     [phone]      Hotlines:   Please program these numbers into your phone in case you or someone you know needs them  All services are free  WarmLine:  [phone] or [phone]   They provide a supportive listening ear if you need it  They also can also provide information about mental health concerns and services  Crisis Line:  [facility] [phone],  [facility] [phone], [address] [place] [address] and [place]: [phone]   24/7 crisis counseling, on-site counseling and walk-in counseling services available  National Suicide Prevention Lifeline:  4-475.829.1706  [address] 1200 [facility] [phone]   This is free, confidential, and available 24/7  [place]: [phone]   For those facing or having survived abuse 24 hour confidential help line, emergency safe house, counseling, advocacy and education  Crisis Text Line: text 276128     You are connected to a Crisis Counselor to bring a hot moment to a cool calm through active listening and collaborative problem solving  If you or someone your know are feeling as though you are going to hurt yourself, do not wait - GET HELP RIGHT AWAY  Go to the closest Emergency Room, call 911, or call someone you trust, family member or friend to be with you until you can get some help

## 2021-09-02 ENCOUNTER — TELEPHONE (OUTPATIENT)
Dept: NEUROLOGY | Facility: CLINIC | Age: 41
End: 2021-09-02

## 2021-09-09 ENCOUNTER — TELEPHONE (OUTPATIENT)
Dept: FAMILY MEDICINE CLINIC | Facility: CLINIC | Age: 41
End: 2021-09-09

## 2021-09-09 ENCOUNTER — TELEPHONE (OUTPATIENT)
Dept: NEUROLOGY | Facility: CLINIC | Age: 41
End: 2021-09-09

## 2021-09-09 ENCOUNTER — LAB (OUTPATIENT)
Dept: LAB | Facility: HOSPITAL | Age: 41
End: 2021-09-09
Payer: COMMERCIAL

## 2021-09-09 DIAGNOSIS — M79.10 MYALGIA: ICD-10-CM

## 2021-09-09 DIAGNOSIS — Z13.6 SCREENING FOR CARDIOVASCULAR CONDITION: ICD-10-CM

## 2021-09-09 DIAGNOSIS — Z13.1 SCREENING FOR DIABETES MELLITUS: ICD-10-CM

## 2021-09-09 DIAGNOSIS — G40.409 TONIC-CLONIC EPILEPTIC SEIZURES (HCC): ICD-10-CM

## 2021-09-09 DIAGNOSIS — E66.9 CLASS 1 OBESITY WITHOUT SERIOUS COMORBIDITY WITH BODY MASS INDEX (BMI) OF 32.0 TO 32.9 IN ADULT, UNSPECIFIED OBESITY TYPE: ICD-10-CM

## 2021-09-09 DIAGNOSIS — E66.9 OBESITY (BMI 30.0-34.9): ICD-10-CM

## 2021-09-09 DIAGNOSIS — G40.409 TONIC-CLONIC EPILEPTIC SEIZURES (HCC): Primary | ICD-10-CM

## 2021-09-09 LAB
25(OH)D3 SERPL-MCNC: 37.6 NG/ML (ref 30–100)
ALBUMIN SERPL BCP-MCNC: 3.9 G/DL (ref 3.5–5)
ALP SERPL-CCNC: 81 U/L (ref 46–116)
ALT SERPL W P-5'-P-CCNC: 28 U/L (ref 12–78)
ANION GAP SERPL CALCULATED.3IONS-SCNC: 7 MMOL/L (ref 4–13)
AST SERPL W P-5'-P-CCNC: 14 U/L (ref 5–45)
BASOPHILS # BLD AUTO: 0.03 THOUSANDS/ΜL (ref 0–0.1)
BASOPHILS NFR BLD AUTO: 0 % (ref 0–1)
BILIRUB SERPL-MCNC: 0.52 MG/DL (ref 0.2–1)
BUN SERPL-MCNC: 21 MG/DL (ref 5–25)
CALCIUM SERPL-MCNC: 9.1 MG/DL (ref 8.3–10.1)
CHLORIDE SERPL-SCNC: 112 MMOL/L (ref 100–108)
CHOLEST SERPL-MCNC: 205 MG/DL (ref 50–200)
CO2 SERPL-SCNC: 18 MMOL/L (ref 21–32)
CREAT SERPL-MCNC: 0.92 MG/DL (ref 0.6–1.3)
EOSINOPHIL # BLD AUTO: 0.17 THOUSAND/ΜL (ref 0–0.61)
EOSINOPHIL NFR BLD AUTO: 2 % (ref 0–6)
ERYTHROCYTE [DISTWIDTH] IN BLOOD BY AUTOMATED COUNT: 13.3 % (ref 11.6–15.1)
EST. AVERAGE GLUCOSE BLD GHB EST-MCNC: 94 MG/DL
GFR SERPL CREATININE-BSD FRML MDRD: 78 ML/MIN/1.73SQ M
GLUCOSE P FAST SERPL-MCNC: 81 MG/DL (ref 65–99)
HBA1C MFR BLD: 4.9 %
HCT VFR BLD AUTO: 43.7 % (ref 34.8–46.1)
HDLC SERPL-MCNC: 50 MG/DL
HGB BLD-MCNC: 14.6 G/DL (ref 11.5–15.4)
IMM GRANULOCYTES # BLD AUTO: 0.05 THOUSAND/UL (ref 0–0.2)
IMM GRANULOCYTES NFR BLD AUTO: 1 % (ref 0–2)
LDLC SERPL CALC-MCNC: 129 MG/DL (ref 0–100)
LDLC SERPL DIRECT ASSAY-MCNC: 120 MG/DL (ref 0–100)
LYMPHOCYTES # BLD AUTO: 2.23 THOUSANDS/ΜL (ref 0.6–4.47)
LYMPHOCYTES NFR BLD AUTO: 29 % (ref 14–44)
MCH RBC QN AUTO: 30.5 PG (ref 26.8–34.3)
MCHC RBC AUTO-ENTMCNC: 33.4 G/DL (ref 31.4–37.4)
MCV RBC AUTO: 91 FL (ref 82–98)
MONOCYTES # BLD AUTO: 0.55 THOUSAND/ΜL (ref 0.17–1.22)
MONOCYTES NFR BLD AUTO: 7 % (ref 4–12)
NEUTROPHILS # BLD AUTO: 4.7 THOUSANDS/ΜL (ref 1.85–7.62)
NEUTS SEG NFR BLD AUTO: 61 % (ref 43–75)
NONHDLC SERPL-MCNC: 155 MG/DL
NRBC BLD AUTO-RTO: 0 /100 WBCS
PLATELET # BLD AUTO: 280 THOUSANDS/UL (ref 149–390)
PMV BLD AUTO: 11 FL (ref 8.9–12.7)
POTASSIUM SERPL-SCNC: 3.8 MMOL/L (ref 3.5–5.3)
PROT SERPL-MCNC: 7.9 G/DL (ref 6.4–8.2)
RBC # BLD AUTO: 4.78 MILLION/UL (ref 3.81–5.12)
SODIUM SERPL-SCNC: 137 MMOL/L (ref 136–145)
TRIGL SERPL-MCNC: 132 MG/DL
TSH SERPL DL<=0.05 MIU/L-ACNC: 1.05 UIU/ML (ref 0.36–3.74)
WBC # BLD AUTO: 7.73 THOUSAND/UL (ref 4.31–10.16)

## 2021-09-09 PROCEDURE — 80061 LIPID PANEL: CPT

## 2021-09-09 PROCEDURE — 80053 COMPREHEN METABOLIC PANEL: CPT

## 2021-09-09 PROCEDURE — 80201 ASSAY OF TOPIRAMATE: CPT

## 2021-09-09 PROCEDURE — 36415 COLL VENOUS BLD VENIPUNCTURE: CPT

## 2021-09-09 PROCEDURE — 82306 VITAMIN D 25 HYDROXY: CPT

## 2021-09-09 PROCEDURE — 83036 HEMOGLOBIN GLYCOSYLATED A1C: CPT

## 2021-09-09 PROCEDURE — 84443 ASSAY THYROID STIM HORMONE: CPT

## 2021-09-09 PROCEDURE — 85025 COMPLETE CBC W/AUTO DIFF WBC: CPT

## 2021-09-09 PROCEDURE — 83721 ASSAY OF BLOOD LIPOPROTEIN: CPT

## 2021-09-09 NOTE — TELEPHONE ENCOUNTER
Please schedule:    Return in about 6 weeks (around 5/26/2021) for F/U Adjust D/O, Obesity, Labs    Labs done 9/9/21

## 2021-09-09 NOTE — RESULT ENCOUNTER NOTE
Unstable labs - will review with patient at upcoming appointment  Hyperlipidemia - Recommend lifestyle modifications          The 10-year ASCVD risk score (Trey Louis et al , 2013) is: 0 6%    Values used to calculate the score:      Age: 37 years      Sex: Female      Is Non- : No      Diabetic: No      Tobacco smoker: No      Systolic Blood Pressure: 342 mmHg      Is BP treated: No      HDL Cholesterol: 50 mg/dL      Total Cholesterol: 205 mg/dL

## 2021-09-09 NOTE — TELEPHONE ENCOUNTER
Patient calling to say that they cannot draw topiramate level  Looks like lab has   New lab order entered

## 2021-09-13 ENCOUNTER — OFFICE VISIT (OUTPATIENT)
Dept: FAMILY MEDICINE CLINIC | Facility: CLINIC | Age: 41
End: 2021-09-13
Payer: COMMERCIAL

## 2021-09-13 VITALS
DIASTOLIC BLOOD PRESSURE: 66 MMHG | TEMPERATURE: 98.1 F | SYSTOLIC BLOOD PRESSURE: 118 MMHG | BODY MASS INDEX: 33.34 KG/M2 | RESPIRATION RATE: 16 BRPM | HEIGHT: 68 IN | OXYGEN SATURATION: 99 % | HEART RATE: 77 BPM | WEIGHT: 220 LBS

## 2021-09-13 DIAGNOSIS — Z13.1 SCREENING FOR DIABETES MELLITUS: ICD-10-CM

## 2021-09-13 DIAGNOSIS — Z12.31 VISIT FOR SCREENING MAMMOGRAM: ICD-10-CM

## 2021-09-13 DIAGNOSIS — E78.49 OTHER HYPERLIPIDEMIA: ICD-10-CM

## 2021-09-13 DIAGNOSIS — Z86.16 HISTORY OF COVID-19: ICD-10-CM

## 2021-09-13 DIAGNOSIS — E66.9 CLASS 1 OBESITY WITHOUT SERIOUS COMORBIDITY WITH BODY MASS INDEX (BMI) OF 33.0 TO 33.9 IN ADULT, UNSPECIFIED OBESITY TYPE: ICD-10-CM

## 2021-09-13 DIAGNOSIS — Z11.59 NEED FOR HEPATITIS C SCREENING TEST: ICD-10-CM

## 2021-09-13 DIAGNOSIS — G40.409 TONIC-CLONIC EPILEPTIC SEIZURES (HCC): ICD-10-CM

## 2021-09-13 DIAGNOSIS — F43.22 ADJUSTMENT DISORDER WITH ANXIETY: Primary | ICD-10-CM

## 2021-09-13 DIAGNOSIS — Z13.6 SCREENING FOR CARDIOVASCULAR CONDITION: ICD-10-CM

## 2021-09-13 DIAGNOSIS — R05.9 COUGH: ICD-10-CM

## 2021-09-13 LAB — TOPIRAMATE SERPL-MCNC: 9.6 UG/ML (ref 2–25)

## 2021-09-13 PROCEDURE — 99214 OFFICE O/P EST MOD 30 MIN: CPT | Performed by: FAMILY MEDICINE

## 2021-09-13 PROCEDURE — 3725F SCREEN DEPRESSION PERFORMED: CPT | Performed by: FAMILY MEDICINE

## 2021-09-13 PROCEDURE — 3008F BODY MASS INDEX DOCD: CPT | Performed by: PSYCHIATRY & NEUROLOGY

## 2021-09-13 RX ORDER — ALBUTEROL SULFATE 90 UG/1
2 AEROSOL, METERED RESPIRATORY (INHALATION) EVERY 4 HOURS PRN
Qty: 18 G | Refills: 0 | Status: SHIPPED | OUTPATIENT
Start: 2021-09-13 | End: 2021-09-23

## 2021-09-13 NOTE — ASSESSMENT & PLAN NOTE
Worsening  Recommend lifestyle modifications  To consider sleep study PRN? On Topamax per Neurology for the treatment of epilepsy and migraines  Patient is considering returning to weight management

## 2021-09-13 NOTE — PROGRESS NOTES
Assessment/Plan:  Problem List Items Addressed This Visit        Nervous and Auditory    Tonic-clonic epileptic seizures ([address] )      Stable on Topamax  Management per Neurology  Other    Class 1 obesity without serious comorbidity with body mass index (BMI) of 33 0 to 33 9 in adult     Worsening  Recommend lifestyle modifications  To consider sleep study PRN? On Topamax per Neurology for the treatment of epilepsy and migraines  Patient is considering returning to weight management  Relevant Orders    Ambulatory referral to Weight Management    TSH, 3rd generation with Free T4 reflex    Adjustment disorder with anxiety - Primary       Stable  Patient declines mood medications and counseling at this time  Smart phone herb list and counseling herb list provided previously  Relevant Orders    TSH, 3rd generation with Free T4 reflex    Other hyperlipidemia     Stable s statin  Recommend lifestyle modifications  Relevant Orders    Ambulatory referral to Weight Management    CBC and differential    Comprehensive metabolic panel    Lipid panel    TSH, 3rd generation with Free T4 reflex    LDL cholesterol, direct      Other Visit Diagnoses     History of COVID-19        Relevant Medications    albuterol (PROVENTIL HFA,VENTOLIN HFA) 90 mcg/act inhaler    Due to COVID 8/11/21  Trial of Albuterol PRN  If no improvement 3 months s/p COVID, To consider [address] clinic? Cough        Relevant Medications    albuterol (PROVENTIL HFA,VENTOLIN HFA) 90 mcg/act inhaler    Due to COVID 8/11/21  Trial of Albuterol PRN  If no improvement 3 months s/p COVID, To consider [address] clinic?         Screening for cardiovascular condition        Relevant Orders    CBC and differential    Comprehensive metabolic panel    Lipid panel    LDL cholesterol, direct    Screening for diabetes mellitus        Relevant Orders    Hemoglobin A1C    Visit for screening mammogram        Relevant Orders    Mammo screening bilateral w 3d & cad    Need for hepatitis C screening test        Relevant Orders    Hepatitis C antibody           Return in about 1 year (around 9/13/2022) for Annual physical, Labs; PRN 2mo - F/U COVID  Future Appointments   Date Time Provider Cahn Guerrero   9/15/2021 10:30 AM Myriam Bob MD NEURO [facility]   10/28/2021 10:00 AM Sonido Gunderson MD [facility]   9/14/2022 12:20 PM Valeria Patino DO [facility]        Subjective:     Tito Vega is a 37 y o  female who presents today for a follow-up on her chronic medical conditions  HPI:  Chief Complaint   Patient presents with    Follow-up     WEIGHT, REVIEW BLOOD WORK     Medication Refill     N/A    Labs Only     COMPLTED     HM     HEP C     -- Above per clinical staff and reviewed  --      HPI      Today:      Return in about 6 weeks (around 5/26/2021) for F/U Adjust D/O, Obesity, Labs  Patient had COVID 8/21 - took at at home test 8/11/21  Still has dry cough        Obesity - Last saw Weight Management in 2017  Saw dietician - set up a diet plan, sees PRN- advised Dandelion Root and Tumeric  Watching diet  Using Weight Watchers tracker  +Exercise - Walking 20 minutes, 5 times per week  Previously Running 25 minutes, 6 days per week  Strength training 30 minutes, 5 days per week  She has been exercising and watching her diet since 5/20  She is frustrated that she is not losing weight as she could do so in the past   She sometimes snores in her sleep when laying supine  Denies witnessed apnea  No prior sleep study  [place] 4 on 4/14/21  Hyperlipidemia - No statin previously                  Epilepsy / Migraine - Management per Neuro Dr Kaci Haas  Next appt 9/21  On Topamax 50mg 3 pills BID  Last seizure 2014    Previously on Lamictal        Adjustment Disorder / Anxiety - Gyn previously Rx Wellbutrin - D/C due to frequent thoughts about death, but patient denied SI, D/C Zoloft as it caused fatigue  No other mood meds  No counseling previously  Good social supports  No SI/HI/AH/VH  Her mother (who has cancer) lives around the corner from her mother, who watches her two children             PHQ-9 Depression Screening    PHQ-9:   Frequency of the following problems over the past two weeks:      Little interest or pleasure in doing things: 0 - not at all  Feeling down, depressed, or hopeless: 0 - not at all  Trouble falling or staying asleep, or sleeping too much: 0 - not at all  Feeling tired or having little energy: 0 - not at all  Poor appetite or overeatin - not at all  Feeling bad about yourself - or that you are a failure or have let yourself or your family down: 0 - not at all  Trouble concentrating on things, such as reading the newspaper or watching television: 0 - not at all  Moving or speaking so slowly that other people could have noticed  Or the opposite - being so fidgety or restless that you have been moving around a lot more than usual: 0 - not at all  Thoughts that you would be better off dead, or of hurting yourself in some way: 0 - not at all  PHQ-2 Score: 0  PHQ-9 Score: 0         CHARLI-7 Flowsheet Screening      Most Recent Value   Over the last 2 weeks, how often have you been bothered by any of the following problems? Feeling nervous, anxious, or on edge  0   Not being able to stop or control worrying  0   Worrying too much about different things  0   Trouble relaxing  0   Being so restless that it is hard to sit still  0   Becoming easily annoyed or irritable  0   Feeling afraid as if something awful might happen  0   CHARLI-7 Total Score  0                            From previous note:    Controlled Substance Review     [place]MP or [place]  reviewed: No red flags were identified; safe to proceed with prescription        Mother is Orval Bouche - Ovarian Cancer  Mother had genetic testing, but patient states the testing was unrevaling and BRCA negative  Patient is wondering what preventative things she should do to avoid Ovarian Cancer        Obesity - Last saw Weight Management in 2017  Saw dietician - set up a diet plan, sees PRN- advised Dandelion Root and Tumeric  Watching diet  Using Weight Watchers tracker  +Exercise - Running 25 minutes, 6 days per week  Strength training 30 minutes, 5 days per week  She has been exercising and watching her diet since   She is frustrated that she is not losing weight as she could do so in the past   She sometimes snores in her sleep when laying supine  Denies witnessed apnea  No prior sleep study  [place] 4 on 21                  Epilepsy / Migraine - Management per Neuro Dr Brenda Gee  Next appt   On Topamax 50mg 3 pills BID  Last seizure   Previously on Lamictal        Adjustment Disorder / Anxiety - Gyn previously Rx Wellbutrin - D/C due to frequent thoughts about death, but patient denied SI, D/C Zoloft as it caused fatigue  No other mood meds  No counseling previously  Good social supports  No SI/HI/AH/VH  Her mother lives around the corner from her mother, who watches her two children            PHQ-9 Depression Screening    PHQ-9:   Frequency of the following problems over the past two weeks:      Little interest or pleasure in doing things: 0 - not at all  Feeling down, depressed, or hopeless: 0 - not at all  Trouble falling or staying asleep, or sleeping too much: 0 - not at all  Feeling tired or having little energy: 0 - not at all  Poor appetite or overeatin - not at all  Feeling bad about yourself - or that you are a failure or have let yourself or your family down: 0 - not at all  Trouble concentrating on things, such as reading the newspaper or watching television: 0 - not at all  Moving or speaking so slowly that other people could have noticed   Or the opposite - being so fidgety or restless that you have been moving around a lot more than usual: 0 - not at all  Thoughts that you would be better off dead, or of hurting yourself in some way: 0 - not at all  PHQ-2 Score: 0  PHQ-9 Score: 0             CHARLI-7 Flowsheet Screening      Most Recent Value   Over the last two weeks, how often have you been bothered by the following problems? Feeling nervous, anxious, or on edge  1   Not being able to stop or control worrying  1   Worrying too much about different things  0   Trouble relaxing   0   Being so restless that it's hard to sit still  0   Becoming easily annoyed or irritable   1   Feeling afraid as if something awful might happen  1   How difficult have these problems made it for you to do your work, take care of things at home, or get along with other people? Not difficult at all   CHARLI Score   4             MDQ:  0     Reviewed:  Labs?, Neuro 9/8/20, Gyn 7/27/20     Alma Palmer yearly  Next appt 4/21        Sees Dentist q6 months  Overdue for Optho        Overdue for Tdap                The following portions of the patient's history were reviewed and updated as appropriate: allergies, current medications, past family history, past medical history, past social history, past surgical history and problem list       Review of Systems   Constitutional: Negative for appetite change, chills, diaphoresis, fatigue and fever  Respiratory: Positive for cough and shortness of breath (STAPLES s/p exercise)  Negative for chest tightness and wheezing  Cardiovascular: Negative for chest pain  Gastrointestinal: Negative for abdominal pain, blood in stool, diarrhea, nausea and vomiting  Genitourinary: Negative for dysuria          Current Outpatient Medications   Medication Sig Dispense Refill    B Complex Vitamins (B COMPLEX PO) Take 1 tablet by mouth daily      Multiple Vitamin tablet Take 1 tablet by mouth daily       topiramate (TOPAMAX) 50 MG tablet Take 3 tablets (150 mg total) by mouth 2 (two) times a day (Patient taking differently: Take 150 mg by mouth 2 (two) times a day Rx per Neuro) 540 tablet 3    Turmeric (QC TUMERIC COMPLEX PO) Take 3 tablets by mouth daily      albuterol (PROVENTIL HFA,VENTOLIN HFA) 90 mcg/act inhaler Inhale 2 puffs every 4 (four) hours as needed for shortness of breath for up to 10 days 18 g 0     No current facility-administered medications for this visit  Objective:  /66   Pulse 77   Temp 98 1 °F (36 7 °C)   Resp 16   Ht 5' 7 52" (1 715 m)   Wt 99 8 kg (220 lb)   SpO2 99%   BMI 33 93 kg/m²    Wt Readings from Last 3 Encounters:   09/13/21 99 8 kg (220 lb)   04/14/21 97 kg (213 lb 12 8 oz)   09/08/20 96 7 kg (213 lb 3 2 oz)      BP Readings from Last 3 Encounters:   09/13/21 118/66   04/14/21 114/72   09/08/20 120/82          Physical Exam  Vitals and nursing note reviewed  Constitutional:       Appearance: Normal appearance  She is well-developed  She is obese  HENT:      Head: Normocephalic and atraumatic  Eyes:      Conjunctiva/sclera: Conjunctivae normal    Neck:      Thyroid: No thyromegaly  Cardiovascular:      Rate and Rhythm: Normal rate and regular rhythm  Pulses: Normal pulses  Heart sounds: Normal heart sounds  Pulmonary:      Effort: Pulmonary effort is normal       Breath sounds: Normal breath sounds  Musculoskeletal:         General: No swelling  Cervical back: Neck supple  Right lower leg: No edema  Left lower leg: No edema  Neurological:      General: No focal deficit present  Mental Status: She is alert and oriented to person, place, and time  Psychiatric:         Mood and Affect: Mood normal          Behavior: Behavior normal          Thought Content:  Thought content normal          Judgment: Judgment normal          Lab Results:      Lab Results   Component Value Date    WBC 7 73 09/09/2021    HGB 14 6 09/09/2021    HCT 43 7 09/09/2021     09/09/2021    TRIG 132 09/09/2021    HDL 50 09/09/2021    LDLDIRECT 120 (H) 09/09/2021    ALT 28 09/09/2021    AST 14 09/09/2021    K 3 8 09/09/2021     (H) 09/09/2021    CREATININE 0 92 09/09/2021    BUN 21 09/09/2021    CO2 18 (L) 09/09/2021    GLUF 81 09/09/2021    HGBA1C 4 9 09/09/2021     No results found for: URICACID  Invalid input(s): BASENAME Vitamin D    No results found       POCT Labs

## 2021-09-13 NOTE — ASSESSMENT & PLAN NOTE
Stable  Patient declines mood medications and counseling at this time  Smart phone herb list and counseling herb list provided previously

## 2021-09-15 ENCOUNTER — OFFICE VISIT (OUTPATIENT)
Dept: NEUROLOGY | Facility: CLINIC | Age: 41
End: 2021-09-15
Payer: COMMERCIAL

## 2021-09-15 VITALS
WEIGHT: 217 LBS | HEART RATE: 78 BPM | DIASTOLIC BLOOD PRESSURE: 76 MMHG | TEMPERATURE: 98.1 F | BODY MASS INDEX: 33.47 KG/M2 | SYSTOLIC BLOOD PRESSURE: 112 MMHG

## 2021-09-15 DIAGNOSIS — G40.009 PARTIAL IDIOPATHIC EPILEPSY WITH SEIZURES OF LOCALIZED ONSET, NOT INTRACTABLE, WITHOUT STATUS EPILEPTICUS (HCC): Primary | ICD-10-CM

## 2021-09-15 DIAGNOSIS — G43.109 MIGRAINE WITH AURA AND WITHOUT STATUS MIGRAINOSUS, NOT INTRACTABLE: ICD-10-CM

## 2021-09-15 DIAGNOSIS — G40.409 TONIC-CLONIC EPILEPTIC SEIZURES (HCC): ICD-10-CM

## 2021-09-15 PROCEDURE — 1036F TOBACCO NON-USER: CPT | Performed by: PSYCHIATRY & NEUROLOGY

## 2021-09-15 PROCEDURE — 99213 OFFICE O/P EST LOW 20 MIN: CPT | Performed by: PSYCHIATRY & NEUROLOGY

## 2021-09-15 RX ORDER — TOPIRAMATE 50 MG/1
150 TABLET, FILM COATED ORAL 2 TIMES DAILY
Qty: 180 TABLET | Refills: 11 | Status: SHIPPED | OUTPATIENT
Start: 2021-09-15

## 2021-09-15 NOTE — PROGRESS NOTES
Patient ID: Kwaku Rahman is a 37 y o  female  Assessment/Plan:    No problem-specific Assessment & Plan notes found for this encounter  {Assess/PlanSmartLinks:13249}       Subjective:    HPI    {[facility] HPI texts:81784}    {Common ambulatory SmartLinks:16989}         Objective:    Blood pressure 112/76, pulse 78, temperature 98 1 °F (36 7 °C), temperature source Temporal, weight 98 4 kg (217 lb), not currently breastfeeding  Physical Exam    Neurological Exam      ROS:    Review of Systems   Constitutional: Negative  Negative for appetite change and fever  HENT: Negative  Negative for hearing loss, tinnitus, trouble swallowing and voice change  Eyes: Negative  Negative for photophobia and pain  R eye twitching   Respiratory: Negative  Negative for shortness of breath  Cardiovascular: Negative  Negative for palpitations  Gastrointestinal: Negative  Negative for nausea and vomiting  Endocrine: Negative  Negative for cold intolerance  Genitourinary: Negative  Negative for dysuria, frequency and urgency  Musculoskeletal: Negative  Negative for myalgias and neck pain  Skin: Negative  Negative for rash  Neurological: Negative  Negative for dizziness, tremors, seizures, syncope, facial asymmetry, speech difficulty, weakness, light-headedness, numbness and headaches  Hematological: Negative  Does not bruise/bleed easily  Psychiatric/Behavioral: Negative  Negative for confusion, hallucinations and sleep disturbance

## 2021-09-15 NOTE — PROGRESS NOTES
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 with seizure freedom since about 2013 while on monotherapy with topiramate  Her neurological exam is normal  There are no clear seizure risk factors  EEG data (temporal slowing and possible temporal discharges, not personally reviewed) and semiology suggest focal epilepsy       Migraine controlled  Continue topiramate 150 mg bid       No changes today  Patient Instructions   1  Continue current seizure medications unchanged  2  Let us know if there are seizures or problems with your medication  3  Return in one year to see [facility], [address] [place] [address]  Diagnoses and all orders for this visit:    Partial idiopathic epilepsy with seizures of localized onset, not intractable, without status epilepticus ([address] )    Migraine with aura and without status migrainosus, not intractable  -     topiramate (TOPAMAX) 50 MG tablet; Take 3 tablets (150 mg total) by mouth 2 (two) times a day    Tonic-clonic epileptic seizures ([address] )  -     topiramate (TOPAMAX) 50 MG tablet; Take 3 tablets (150 mg total) by mouth 2 (two) times a day        Gloria    Freida Carrion is returning to the [place] [address] [facility] for follow up  Interval Events:   Seizures since last visit: None  Hospitalizations: no    No seizures  Migraines controlled  Occasional right blepharospasm maybe once per week  Intermittent right lower eye lid twitching, video shows mild fasciculations isolated to right lower lid  Spasms not painful, disabling or embarrassing  Current AEDs:  Topiramate 150 mg bid  Medication side effects: None  Medication adherence: yes     Event/Seizure semiology:  1  GTC seizure  None for 4-5 years  2  Aura involving black and white vision, tunnel vision, racing heart sensation may occur   Precedes #1 by 30 seconds or may rarely occur without progression  None for 4-5 years       Special Features  Status epilepticus: no  Self Injury Seizures: yes - falls  Precipitating Factors: none     Epilepsy Risk Factors: Told by Dr Danuta Dicekrson that she has a lesion in her brain, details uncertain  Normal birth and development  No history of seizures as a child  No family history of seizures  No head trauma resulting in loss of consciousness  No history of stroke or CNS infection       Prior AEDs:  Carbamazepine  Lamotrigine ineffective, forgetful  Levetiracetam ineffective  Pregabalin - couldnât feel feet  Vimpat  Thinks there were others, but does not recall     Prior Evaluation:  Was getting annual MRIs with Dr Danuta Dickerson, apparently to follow a lesion in the left temporal lobe  (records requested from [facility], [facility] everyhwere records in Epic)     Greater than 1 hour EEG: Mildly abnormal EEG in wakefulness and sleep due to the presence of two episodes in drowsiness of focal left temporal slowing (T3 maximal)  48 hour AEEG 11/9/2009 (Dr France Dudley): The patient's noted numbing in face and headache were not associated with epileptiform activity  In comparison with a prior greater than one hour EEG read by myself on 9/30/2009 that study was mildly suggestive of a left temporal abnormality which was not clearly demonstrated during this study  REEG 3/30/2012 (Dr Danuta Dickerson): Normal awake and asleep  REEG 1/2/2013 (Dr Danuta Dickerson): bitemporal independent sharp waves      History Reviewed: The following were reviewed and updated as appropriate: allergies, current medications, past medical history, past social history and problem list     Psychiatric History:  None     Social History:   Driving: Yes  Lives Alone: No  Occupation: Realtor     ROS  Constitutional: Negative  Negative for appetite change and fever  HENT: Negative  Negative for hearing loss, tinnitus, trouble swallowing and voice change  Eyes: Negative  Negative for photophobia and pain  R eye twitching   Respiratory: Negative  Negative for shortness of breath  Cardiovascular: Negative  Negative for palpitations  Gastrointestinal: Negative  Negative for nausea and vomiting  Endocrine: Negative  Negative for cold intolerance  Genitourinary: Negative  Negative for dysuria, frequency and urgency  Musculoskeletal: Negative  Negative for myalgias and neck pain  Skin: Negative  Negative for rash  Neurological: Negative  Negative for dizziness, tremors, seizures, syncope, facial asymmetry, speech difficulty, weakness, light-headedness, numbness and headaches  Hematological: Negative  Does not bruise/bleed easily  Psychiatric/Behavioral: Negative  Negative for confusion, hallucinations and sleep disturbance  ROS reviewed and updated as appropriate  Objective    /76 (BP Location: Left arm, Patient Position: Sitting, Cuff Size: Large)   Pulse 78   Temp 98 1 °F (36 7 °C) (Temporal)   Wt 98 4 kg (217 lb)   BMI 33 47 kg/m²      General Exam  No acute distress  Neurologic Exam  Mental Status:  Alert and oriented x 3  Language: normal fluency and comprehension  Cranial Nerves:  EOMI, no nystagums  Face symmetric with equal activation  No ptosis, spasm or eyelid twitching with prolonged up gaze and repeated hard eye closure  No dysarthria  Gait: Normal casual gait

## 2021-09-15 NOTE — PATIENT INSTRUCTIONS
1  Continue current seizure medications unchanged  2  Let us know if there are seizures or problems with your medication  3  Return in one year to see [employer], [place]

## 2021-11-09 ENCOUNTER — ANNUAL EXAM (OUTPATIENT)
Dept: OBGYN CLINIC | Facility: CLINIC | Age: 41
End: 2021-11-09
Payer: COMMERCIAL

## 2021-11-09 VITALS
BODY MASS INDEX: 32.2 KG/M2 | DIASTOLIC BLOOD PRESSURE: 84 MMHG | HEIGHT: 69 IN | SYSTOLIC BLOOD PRESSURE: 120 MMHG | WEIGHT: 217.4 LBS

## 2021-11-09 DIAGNOSIS — Z01.419 WOMEN'S ANNUAL ROUTINE GYNECOLOGICAL EXAMINATION: Primary | ICD-10-CM

## 2021-11-09 DIAGNOSIS — R10.2 PELVIC PAIN: ICD-10-CM

## 2021-11-09 PROCEDURE — 1036F TOBACCO NON-USER: CPT | Performed by: OBSTETRICS & GYNECOLOGY

## 2021-11-09 PROCEDURE — 3008F BODY MASS INDEX DOCD: CPT | Performed by: OBSTETRICS & GYNECOLOGY

## 2021-11-09 PROCEDURE — 99396 PREV VISIT EST AGE 40-64: CPT | Performed by: OBSTETRICS & GYNECOLOGY

## 2021-11-10 ENCOUNTER — HOSPITAL ENCOUNTER (OUTPATIENT)
Dept: RADIOLOGY | Age: 41
Discharge: HOME | End: 2021-11-10
Payer: COMMERCIAL

## 2021-11-10 DIAGNOSIS — R10.2 PELVIC PAIN: ICD-10-CM

## 2021-11-10 PROCEDURE — 76830 TRANSVAGINAL US NON-OB: CPT

## 2021-11-10 PROCEDURE — 76856 US EXAM PELVIC COMPLETE: CPT

## 2021-11-17 LAB
CLINICAL INFO: NORMAL
CYTO CVX: NORMAL
CYTOLOGY CMNT CVX/VAG CYTO-IMP: NORMAL
DATE PREVIOUS BX: NORMAL
HPV E6+E7 MRNA CVX QL NAA+PROBE: NOT DETECTED
LMP START DATE: NORMAL
SL AMB PREV. PAP:: NORMAL
SPECIMEN SOURCE CVX/VAG CYTO: NORMAL

## 2022-01-26 ENCOUNTER — TELEPHONE (OUTPATIENT)
Dept: BARIATRICS | Facility: CLINIC | Age: 42
End: 2022-01-26

## 2022-01-26 ENCOUNTER — CONSULT (OUTPATIENT)
Dept: BARIATRICS | Facility: CLINIC | Age: 42
End: 2022-01-26
Payer: COMMERCIAL

## 2022-01-26 VITALS
HEART RATE: 70 BPM | RESPIRATION RATE: 14 BRPM | SYSTOLIC BLOOD PRESSURE: 118 MMHG | TEMPERATURE: 98.8 F | WEIGHT: 221.6 LBS | DIASTOLIC BLOOD PRESSURE: 80 MMHG | BODY MASS INDEX: 34.78 KG/M2 | HEIGHT: 67 IN

## 2022-01-26 DIAGNOSIS — E66.9 OBESITY, CLASS I, BMI 30-34.9: ICD-10-CM

## 2022-01-26 DIAGNOSIS — E66.9 CLASS 1 OBESITY WITHOUT SERIOUS COMORBIDITY WITH BODY MASS INDEX (BMI) OF 33.0 TO 33.9 IN ADULT, UNSPECIFIED OBESITY TYPE: ICD-10-CM

## 2022-01-26 DIAGNOSIS — E78.49 OTHER HYPERLIPIDEMIA: ICD-10-CM

## 2022-01-26 DIAGNOSIS — G43.109 MIGRAINE WITH AURA AND WITHOUT STATUS MIGRAINOSUS, NOT INTRACTABLE: Primary | ICD-10-CM

## 2022-01-26 DIAGNOSIS — G40.009 PARTIAL IDIOPATHIC EPILEPSY WITH SEIZURES OF LOCALIZED ONSET, NOT INTRACTABLE, WITHOUT STATUS EPILEPTICUS (HCC): ICD-10-CM

## 2022-01-26 PROCEDURE — 3008F BODY MASS INDEX DOCD: CPT | Performed by: PHYSICIAN ASSISTANT

## 2022-01-26 PROCEDURE — 1036F TOBACCO NON-USER: CPT | Performed by: PHYSICIAN ASSISTANT

## 2022-01-26 PROCEDURE — 99214 OFFICE O/P EST MOD 30 MIN: CPT | Performed by: PHYSICIAN ASSISTANT

## 2022-01-26 NOTE — PROGRESS NOTES
Assessment/Plan:    Obesity, Class I, BMI 30-34 9  -Discussed options of HealthyCORE-Intensive Lifestyle Intervention Program, Very Low Calorie Diet-VLCD and Conservative Program and the role of weight loss medications   -Initial weight loss goal of 5-10% weight loss for improved health  -Screening labs  Recommend checking lab coverage before having labs drawn  -NEEDS LABS or Labs reviewed from DATE all within acceptable limits  - STOP BANG-0/8  -Patient is interested in pursuing Conservative Program   Patient denies personal and family history of MCT and MEN2 tumors  Patient denies personal history of pancreatitis  Goals:  Food log (ie ) www myfitnesspal com,sparkpeople  com,loseit com,calorieking  com,etc  baritastic  No sugary beverages  At least 64oz of water daily  Increase physical activity by 10 minutes daily  Gradually increase physical activity to a goal of 5 days per week for 30 minutes of MODERATE intensity PLUS 2 days per week of FULL BODY resistance training-running 3 miles 4 times per week   3175-3699 calories per day-1350 on days of exercise   Let me know about saxenda vs wegovy coverage       Migraine with aura and without status migrainosus, not intractable  Taking topamax  -continue management with prescribing provider      Partial idiopathic epilepsy with seizures of localized onset, not intractable, without status epilepticus ([facility] )  Taking topamax  -continue management with prescribing provider      Follow up in approximately 2 months with Non-Surgical Physician/Advanced Practitioner        Diagnoses and all orders for this visit:    Migraine with aura and without status migrainosus, not intractable    Class 1 obesity without serious comorbidity with body mass index (BMI) of 33 0 to 33 9 in adult, unspecified obesity type  -     Ambulatory referral to Weight Management    Other hyperlipidemia  -     Ambulatory referral to Weight Management    Obesity, Class I, BMI 30-34 9    Partial idiopathic epilepsy with seizures of localized onset, not intractable, without status epilepticus ([facility] )          Subjective:   Chief Complaint   Patient presents with    Consult     MWM Consult       Patient ID: Karina Ramirez  is a 37 y o  female with excess weight/obesity here to pursue weight management  Past Medical History:   Diagnosis Date    Adjustment disorder with anxiety     Class 1 obesity without serious comorbidity with body mass index (BMI) of 32 0 to 32 9 in adult 04/14/2021    Female infertility     Migraine with aura and without status migrainosus, not intractable 02/27/2018    Other hyperlipidemia     Seizures ([facility] )     last seizures 5  years ago    Tonic-clonic epileptic seizures ([facility] ) 01/11/2017    Varicella     childhood       HPI:  Obesity/Excess Weight:  Severity: Moderate  Onset:  Since 2017     Modifiers: Diet and Exercise, Physician Supervised Weight Loss Program and Commercial Weight Loss Programs-ie  Weight Watchers, Emeli Lena, Nutrisystem, etc   Contributing factors: unsure   Associated symptoms: comorbid conditions and decreased self esteem     Already logging and staying around 1250 calories-weighs and measures- runs 3 miles-4 times per week     Goals: 190 lbs   Hydration: 4-32 oz containers, 1 cup of unsweetened, 1-2 cup of coffee-black   Alcohol: rare     Colonoscopy-Not applicable    The following portions of the patient's history were reviewed and updated as appropriate: allergies, current medications, past family history, past medical history, past social history, past surgical history and problem list     Review of Systems   HENT: Negative for sore throat  Respiratory: Negative for cough and shortness of breath  Cardiovascular: Negative for chest pain and palpitations  Gastrointestinal: Negative for abdominal pain, constipation, diarrhea, nausea and vomiting  Denies GERD   Endocrine: Negative for cold intolerance and heat intolerance     Genitourinary: Negative for dysuria  Musculoskeletal: Negative for arthralgias and back pain  Skin: Negative for rash  Neurological: Negative for headaches  Psychiatric/Behavioral: Negative for suicidal ideas (denies HI)  Denies Depression and anxiety       Objective:    /80 (BP Location: Left arm, Patient Position: Sitting, Cuff Size: Standard)   Pulse 70   Temp 98 8 °F (37 1 °C) (Tympanic)   Resp 14   Ht 5' 7" (1 702 m)   Wt 101 kg (221 lb 9 6 oz)   BMI 34 71 kg/m²     Physical Exam  Vitals and nursing note reviewed  Constitutional   General appearance: Abnormal   well developed and obese  Eyes No conjunctival injection   Pulmonary   Respiratory effort: No increased work of breathing or signs of respiratory distress  Abdomen   Abdomen: Abnormal   The abdomen was obese  Musculoskeletal   Gait and station: Normal     Skin  Dry   No visible rashes   Psychiatric   Orientation to person, place and time: Normal     Affect: appropriate  Neurological   Normal gait

## 2022-01-26 NOTE — TELEPHONE ENCOUNTER
Please call patient and ask her where she would like the wegovy called to  I have sent her a ???? message with titration instruction  Common side effects of Wegovy may include: increased heart rate (pulse), headache, low blood sugar, GI/abdominal upset, heartburn, fatigue, and dizziness     ----- Message from Aziza Sanderson sent at 1/26/2022 11:12 AM EST -----  PT CALLED IN TO LET YOU KNOW THAT SHE CALLED HER INSURANCE AND WEGOVY IS COVERED WITH NO PRIOR AUTH

## 2022-01-26 NOTE — ASSESSMENT & PLAN NOTE
-Discussed options of HealthyCORE-Intensive Lifestyle Intervention Program, Very Low Calorie Diet-VLCD and Conservative Program and the role of weight loss medications   -Initial weight loss goal of 5-10% weight loss for improved health  -Screening labs  Recommend checking lab coverage before having labs drawn  -NEEDS LABS or Labs reviewed from DATE all within acceptable limits  - STOP BANG-0/8  -Patient is interested in pursuing Conservative Program   Patient denies personal and family history of MCT and MEN2 tumors  Patient denies personal history of pancreatitis  Goals:  Food log (ie ) www myfitnesspal com,sparkpeople  com,loseit com,calorieking  com,etc  baritastic  No sugary beverages  At least 64oz of water daily  Increase physical activity by 10 minutes daily   Gradually increase physical activity to a goal of 5 days per week for 30 minutes of MODERATE intensity PLUS 2 days per week of FULL BODY resistance training-running 3 miles 4 times per week   0681-0035 calories per day-1350 on days of exercise   Let me know about saxenda vs wegovy coverage

## 2022-01-28 DIAGNOSIS — E66.9 OBESITY, CLASS I, BMI 30-34.9: Primary | ICD-10-CM

## 2022-02-02 ENCOUNTER — TELEPHONE (OUTPATIENT)
Dept: BARIATRICS | Facility: CLINIC | Age: 42
End: 2022-02-02

## 2022-02-09 DIAGNOSIS — E66.9 OBESITY, CLASS I, BMI 30-34.9: Primary | ICD-10-CM

## 2022-02-09 RX ORDER — METFORMIN HYDROCHLORIDE 750 MG/1
750 TABLET, EXTENDED RELEASE ORAL
Qty: 30 TABLET | Refills: 1 | Status: SHIPPED | OUTPATIENT
Start: 2022-02-09 | End: 2022-06-02

## 2022-02-16 ENCOUNTER — TELEPHONE (OUTPATIENT)
Dept: BARIATRICS | Facility: CLINIC | Age: 42
End: 2022-02-16

## 2022-03-31 ENCOUNTER — OFFICE VISIT (OUTPATIENT)
Dept: BARIATRICS | Facility: CLINIC | Age: 42
End: 2022-03-31
Payer: COMMERCIAL

## 2022-03-31 VITALS
WEIGHT: 217.6 LBS | TEMPERATURE: 99.1 F | DIASTOLIC BLOOD PRESSURE: 72 MMHG | SYSTOLIC BLOOD PRESSURE: 114 MMHG | HEART RATE: 68 BPM | BODY MASS INDEX: 34.15 KG/M2 | HEIGHT: 67 IN

## 2022-03-31 DIAGNOSIS — G40.009 PARTIAL IDIOPATHIC EPILEPSY WITH SEIZURES OF LOCALIZED ONSET, NOT INTRACTABLE, WITHOUT STATUS EPILEPTICUS (HCC): ICD-10-CM

## 2022-03-31 DIAGNOSIS — G43.109 MIGRAINE WITH AURA AND WITHOUT STATUS MIGRAINOSUS, NOT INTRACTABLE: ICD-10-CM

## 2022-03-31 DIAGNOSIS — E66.9 OBESITY, CLASS I, BMI 30-34.9: Primary | ICD-10-CM

## 2022-03-31 PROCEDURE — 1036F TOBACCO NON-USER: CPT | Performed by: PHYSICIAN ASSISTANT

## 2022-03-31 PROCEDURE — 3008F BODY MASS INDEX DOCD: CPT | Performed by: PHYSICIAN ASSISTANT

## 2022-03-31 PROCEDURE — 99214 OFFICE O/P EST MOD 30 MIN: CPT | Performed by: PHYSICIAN ASSISTANT

## 2022-03-31 NOTE — PROGRESS NOTES
Assessment/Plan:    Obesity, Class I, BMI 30-34 9  -Patient is pursuing Conservative Program  -Initial weight loss goal of 5-10% weight loss for improved health  -wegovy and saxenda not covered  -already taking 300 mg of topamax   -had constipation with metformin     Initial:221 6 lbs   Current:217 6 lbs   Change:-4 lbs   Goal:190 lbs     Goals:  Food log (ie ) www myfitnesspal com,sparkpeople  com,loseit com,calorieking  com,etc  baritastic  No sugary beverages  At least 64oz of water daily  Increase physical activity by 10 minutes daily  Gradually increase physical activity to a goal of 5 days per week for 30 minutes of MODERATE intensity PLUS 2 days per week of FULL BODY resistance training-running 3 miles 4 times per week   4834-3213 calories per day-1350 on days of exercise   Get ekg  Do body comp   Stop metformin        Migraine with aura and without status migrainosus, not intractable  Taking topamax  -continue management with prescribing provider      Partial idiopathic epilepsy with seizures of localized onset, not intractable, without status epilepticus ([facility] )  Taking topamax  -continue management with prescribing provider        Follow up in approximately 2 months with Non-Surgical Physician/Advanced Practitioner  Diagnoses and all orders for this visit:    Obesity, Class I, BMI 30-34 9  -     ECG 12 lead; Future    Migraine with aura and without status migrainosus, not intractable  -     ECG 12 lead; Future    Partial idiopathic epilepsy with seizures of localized onset, not intractable, without status epilepticus ([address] )  -     ECG 12 lead; Future          Subjective:   Chief Complaint   Patient presents with    Follow-up     8 WK MWM /FUP        Patient ID: Debra Olvera  is a 37 y o  female with excess weight/obesity here to pursue weight managment    Patient is pursuing Conservative Program with      HPI    Food logging:logging through [employer] herb with 23 points and averages 17 points   Exercise: run 3 miles a day 5 times per week   Hydration:100 oz of water, 1 cup of unsweetened tea, 1-2 cup of coffee-black   Taking 750 mg of metformin -taking fiber gummies but still only going once a week and using ducolax  Doesn't notice a big difference with her appetite  Occasionally feels hungry     Wants to try phentermine  Will clear with neurology  Needs ekg  Phentermine has as potential side effects of increased heart rate, increased blood pressure, palpitations, headache, dizziness, insomnia, altered mood, abdominal upset, and dry mouth  Notify the provider with change in mood  ER with SI/HI  Phentermine should be stopped prior to surgery  Notify the provider with any changes in vision    B: 3 hard boiled eggs or 2 scrambled eggs with veggies   S: apple or flat bread with PB  L; tuna salad or grilled chicken on lettuces or turkey with veggies   S: n/a or fruit   D: fish/chicken and veggies or taco with brown rice   S: n/a     The following portions of the patient's history were reviewed and updated as appropriate: allergies, current medications, past family history, past medical history, past social history, past surgical history and problem list     Review of Systems   HENT: Negative for sore throat  Respiratory: Negative for cough and shortness of breath  Cardiovascular: Negative for chest pain and palpitations  Gastrointestinal: Negative for abdominal pain, constipation, diarrhea, nausea and vomiting  Denies GERD   Skin: Negative for rash  Psychiatric/Behavioral: Negative for suicidal ideas (denies HI)  Denies depression and anxiety       Objective:    /72   Pulse 68   Temp 99 1 °F (37 3 °C) (Tympanic)   Ht 5' 7" (1 702 m)   Wt 98 7 kg (217 lb 9 6 oz)   BMI 34 08 kg/m²      Physical Exam  Vitals and nursing note reviewed  Constitutional   General appearance: Abnormal   well developed and obese     Eyes No conjunctival injection   Pulmonary   Respiratory effort: No increased work of breathing or signs of respiratory distress  Abdomen   Abdomen: Abnormal   The abdomen was obese  Musculoskeletal   Gait and station: Normal     Skin  Dry   No visible rashes   Psychiatric   Orientation to person, place and time: Normal     Affect: appropriate  Neurological   Normal gait

## 2022-03-31 NOTE — ASSESSMENT & PLAN NOTE
-Patient is pursuing Conservative Program  -Initial weight loss goal of 5-10% weight loss for improved health  -wegovy and saxenda not covered  -already taking 300 mg of topamax   -had constipation with metformin     Initial:221 6 lbs   Current:217 6 lbs   Change:-4 lbs   Goal:190 lbs     Goals:  Food log (ie ) www myfitnesspal com,sparkpeople  com,loseit com,calorieking  com,etc  baritastic  No sugary beverages  At least 64oz of water daily  Increase physical activity by 10 minutes daily   Gradually increase physical activity to a goal of 5 days per week for 30 minutes of MODERATE intensity PLUS 2 days per week of FULL BODY resistance training-running 3 miles 4 times per week   5161-5726 calories per day-1350 on days of exercise   Get ekg  Do body comp   Stop metformin

## 2022-04-01 ENCOUNTER — APPOINTMENT (OUTPATIENT)
Dept: LAB | Age: 42
End: 2022-04-01
Payer: COMMERCIAL

## 2022-04-01 ENCOUNTER — HOSPITAL ENCOUNTER (OUTPATIENT)
Dept: RADIOLOGY | Age: 42
Discharge: HOME | End: 2022-04-01
Payer: COMMERCIAL

## 2022-04-01 VITALS — HEIGHT: 67 IN | WEIGHT: 210 LBS | BODY MASS INDEX: 32.96 KG/M2

## 2022-04-01 DIAGNOSIS — Z12.31 VISIT FOR SCREENING MAMMOGRAM: ICD-10-CM

## 2022-04-01 DIAGNOSIS — G43.109 MIGRAINE WITH AURA AND WITHOUT STATUS MIGRAINOSUS, NOT INTRACTABLE: ICD-10-CM

## 2022-04-01 DIAGNOSIS — G40.009 PARTIAL IDIOPATHIC EPILEPSY WITH SEIZURES OF LOCALIZED ONSET, NOT INTRACTABLE, WITHOUT STATUS EPILEPTICUS (HCC): ICD-10-CM

## 2022-04-01 DIAGNOSIS — E66.9 OBESITY, CLASS I, BMI 30-34.9: ICD-10-CM

## 2022-04-01 PROCEDURE — 77067 SCR MAMMO BI INCL CAD: CPT

## 2022-04-01 PROCEDURE — 77063 BREAST TOMOSYNTHESIS BI: CPT

## 2022-04-02 LAB
ATRIAL RATE: 63 BPM
P AXIS: 45 DEGREES
PR INTERVAL: 204 MS
QRS AXIS: 17 DEGREES
QRSD INTERVAL: 88 MS
QT INTERVAL: 436 MS
QTC INTERVAL: 446 MS
T WAVE AXIS: 35 DEGREES
VENTRICULAR RATE: 63 BPM

## 2022-04-02 PROCEDURE — 93010 ELECTROCARDIOGRAM REPORT: CPT | Performed by: INTERNAL MEDICINE

## 2022-04-04 NOTE — RESULT ENCOUNTER NOTE
Normal mammogram   Advised monthly self-breast exams  Repeat in 1 year      Message sent to patient via [employer] patient portal

## 2022-06-01 NOTE — PROGRESS NOTES
Assessment/Plan:  Problem List Items Addressed This Visit        Nervous and Auditory    Partial idiopathic epilepsy with seizures of localized onset, not intractable, without status epilepticus ([facility] [address] )      Stable on Topamax  Management per Neurology  Other    Obesity, Class I, BMI 30-34 9     Worsening  Recommend lifestyle modifications  To consider sleep study PRN? On Topamax per Neurology for the treatment of epilepsy and migraines  Patient is considering returning to weight management  Adjustment disorder with anxiety - Primary     Worsening as now has physical symptoms of eye twitching  Start Prozac 20mg QD  Counseling advised  Smart phone herb list and counseling herb list provided again today  Relevant Medications    FLUoxetine (PROzac) 20 MG tablet    Other Relevant Orders    Comprehensive metabolic panel      Other Visit Diagnoses     Eyelid twitch     Stress likely contributing as patient has reduced other risk factors  Start Prozac 20mg QD  Counseling advised  Smart phone herb list and counseling herb list provided again today  F/U Optho for Botox injections PRN  Return in about 6 weeks (around 7/14/2022) for 40min - F/U Adjust D/O/Anxiety, Eyelid Twitching, Labs  Future Appointments   Date Time Provider Chan Guerrero   7/14/2022 11:20 AM Kamilla Layton DO [facility]   9/14/2022 12:20 PM Kamilla Layton DO [facility]   9/21/2022  1:30 PM SUSAN Nash NEURO [facility]        Subjective:     Yaquelin Arenas is a 37 y o  female who presents today for a follow-up on her acute medical conditions  HPI:  Chief Complaint   Patient presents with    Follow-up     Left eye twitching, started September  Pt did see eye doctor per eye provider said its from stress     Intermittent but has become worse, pt has cut down on caffeine, reduce stressors awakens pt at night    Labs Only     N/A    Medication Refill     N/A    HM     Did not have covid vax     -- Above per clinical staff and reviewed  --    HPI      Today:      Left Eye Twitching - Symptoms x 9 months, worsening x 2 months  Seen by Neuro (had mild symptoms at the time, was told to monitor) and Optho (seen 5/22, eye is healthy, had a new Rx, would consider Botox 8/22 if symptoms persist)  Occurs once every 20 minutes  Denies eye pain, sometimes had achiness in her left cheek area  She notices twitching of her upper and lower eyelids and sometimes her left cheek  She stopped drinking coffee regulary, sleeping 10 hours Monday - Thursday          Obesity - Last saw Weight Management in 2017   Saw dietician - set up a diet plan, sees PRN- advised Dandelion Root and Randolph   Watching diet   Using Weight Watchers tracker   +Exercise - Walking 20 minutes, 3-4 times per week  Previously Running 25 minutes, 6 days per week   Strength training 30 minutes, 5 days per week   She has been exercising and watching her diet since 5/20    She is frustrated that she is not losing weight as she could do so in the past   She sometimes snores in her sleep when laying supine   Denies witnessed apnea   No prior sleep study   [place] 4 on 4/14/21        Hyperlipidemia - No statin previously                  Epilepsy / Migraine - Management per Neuro Dr Karla Campoverde   Next appt 9/22   On Topamax 50mg 3 pills BID   Last seizure 2014   Previously on Lamictal        Adjustment Disorder / Anxiety - Gyn previously Rx Wellbutrin - D/C due to frequent thoughts about death, but patient denied SI, D/C Zoloft as it caused fatigue   No other mood meds   No counseling previously   Good social supports   No SI/HI/AH/VH  Brigid Braeden mother (who has cancer) lives around the corner from her mother, who watches her two children             PHQ-2/9 Depression Screening    Little interest or pleasure in doing things: 0 - not at all  Feeling down, depressed, or hopeless: 0 - not at all  Trouble falling or staying asleep, or sleeping too much: 0 - not at all  Feeling tired or having little energy: 1 - several days  Poor appetite or overeatin - not at all  Feeling bad about yourself - or that you are a failure or have let yourself or your family down: 0 - not at all  Trouble concentrating on things, such as reading the newspaper or watching television: 0 - not at all  Moving or speaking so slowly that other people could have noticed  Or the opposite - being so fidgety or restless that you have been moving around a lot more than usual: 0 - not at all  Thoughts that you would be better off dead, or of hurting yourself in some way: 0 - not at all  PHQ-2 Score: 0  PHQ-2 Interpretation: Negative depression screen  PHQ-9 Score: 1   PHQ-9 Interpretation: No or Minimal depression          CHARLI-7 Flowsheet Screening    Flowsheet Row Most Recent Value   Over the last 2 weeks, how often have you been bothered by any of the following problems? Feeling nervous, anxious, or on edge 1   Not being able to stop or control worrying 1   Worrying too much about different things 0   Trouble relaxing 0   Being so restless that it is hard to sit still 0   Becoming easily annoyed or irritable 0   Feeling afraid as if something awful might happen 0   CHARLI-7 Total Score 2                     From previous note:    Return in about 6 weeks (around 2021) for F/U Adjust D/O, Obesity, Labs      Patient had COVID  - took at at home test 21  Still has dry cough        Obesity - Last saw Weight Management in 2017   Saw dietician - set up a diet plan, sees PRN- advised Dandelion Root and Tumeric   Watching diet   Using Weight Watchers tracker   +Exercise - Walking 20 minutes, 5 times per week  Previously Running 25 minutes, 6 days per week   Strength training 30 minutes, 5 days per week   She has been exercising and watching her diet since     She is frustrated that she is not losing weight as she could do so in the past  Radha Ochoa sometimes snores in her sleep when laying supine   Denies witnessed apnea   No prior sleep study   [place] 4 on 21        Hyperlipidemia - No statin previously                  Epilepsy / Migraine - Management per Neuro Dr Navin Dyer   Next appt    On Topamax 50mg 3 pills BID   Last seizure    Previously on Lamictal        Adjustment Disorder / Anxiety - Gyn previously Rx Wellbutrin - D/C due to frequent thoughts about death, but patient denied SI, D/C Zoloft as it caused fatigue   No other mood meds   No counseling previously   Good social supports   No SI/HI/AH/VH   Her mother (who has cancer) lives around the corner from her mother, who watches her two children               PHQ-9 Depression Screening       PHQ-9:   Frequency of the following problems over the past two weeks:      Little interest or pleasure in doing things: 0 - not at all  Feeling down, depressed, or hopeless: 0 - not at all  Trouble falling or staying asleep, or sleeping too much: 0 - not at all  Feeling tired or having little energy: 0 - not at all  Poor appetite or overeatin - not at all  Feeling bad about yourself - or that you are a failure or have let yourself or your family down: 0 - not at all  Trouble concentrating on things, such as reading the newspaper or watching television: 0 - not at all  Moving or speaking so slowly that other people could have noticed  Or the opposite - being so fidgety or restless that you have been moving around a lot more than usual: 0 - not at all  Thoughts that you would be better off dead, or of hurting yourself in some way: 0 - not at all  PHQ-2 Score: 0  PHQ-9 Score: 0                   CHARLI-7 Flowsheet Screening      Most Recent Value   Over the last 2 weeks, how often have you been bothered by any of the following problems?    Feeling nervous, anxious, or on edge  0   Not being able to stop or control worrying  0   Worrying too much about different things  0   Trouble relaxing  0   Being so restless that it is hard to sit still  0   Becoming easily annoyed or irritable  0   Feeling afraid as if something awful might happen  0   CHARLI-7 Total Score  0                                     From previous note:     Controlled Substance Review     [place] PDMP or [place]  reviewed: No red flags were identified; safe to proceed with prescription        Mother is Kelton Titus - Ovarian Cancer   Mother had genetic testing, but patient states the testing was unrevaling and BRCA negative   Patient is wondering what preventative things she should do to avoid Ovarian Cancer        Obesity - Last saw Weight Management in 2017   Saw dietician - set up a diet plan, sees PRN- advised Dandelion Root and Randolph   Watching diet   Using Weight Watchers tracker   +Exercise - Running 25 minutes, 6 days per week   Strength training 30 minutes, 5 days per week   She has been exercising and watching her diet since 5/20    She is frustrated that she is not losing weight as she could do so in the past   She sometimes snores in her sleep when laying supine   Denies witnessed apnea   No prior sleep study   [place] 4 on 4/14/21                  Epilepsy / Migraine - Management per Neuro Dr Noe Nuñez   Next appt 9/21   On Topamax 50mg 3 pills BID   Last seizure 2014   Previously on Lamictal        Adjustment Disorder / Anxiety - Gyn previously Rx Wellbutrin - D/C due to frequent thoughts about death, but patient denied SI, D/C Zoloft as it caused fatigue   No other mood meds   No counseling previously   Good social supports   No SI/HI/AH/VH   Her mother lives around the corner from her mother, who watches her two children            PHQ-9 Depression Screening    PHQ-9:   Frequency of the following problems over the past two weeks:      Little interest or pleasure in doing things: 0 - not at all  Feeling down, depressed, or hopeless: 0 - not at all  Trouble falling or staying asleep, or sleeping too much: 0 - not at all  Feeling tired or having little energy: 0 - not at all  Poor appetite or overeatin - not at all  Feeling bad about yourself - or that you are a failure or have let yourself or your family down: 0 - not at all  Trouble concentrating on things, such as reading the newspaper or watching television: 0 - not at all  Moving or speaking so slowly that other people could have noticed  Or the opposite - being so fidgety or restless that you have been moving around a lot more than usual: 0 - not at all  Thoughts that you would be better off dead, or of hurting yourself in some way: 0 - not at all  PHQ-2 Score: 0  PHQ-9 Score: 0               CHARLI-7 Flowsheet Screening      Most Recent Value   Over the last two weeks, how often have you been bothered by the following problems? Feeling nervous, anxious, or on edge  1   Not being able to stop or control worrying  1   Worrying too much about different things  0   Trouble relaxing   0   Being so restless that it's hard to sit still  0   Becoming easily annoyed or irritable   1   Feeling afraid as if something awful might happen  1   How difficult have these problems made it for you to do your work, take care of things at home, or get along with other people?   Not difficult at all   CHARLI Score   4             MDQ:  0     Reviewed: Deboraha Last?, Neuro 20, Gyn 20     Camilo Leo yearly   Next appt         Sees Dentist q6 months  Overdue for Optho        Overdue for Tdap               The following portions of the patient's history were reviewed and updated as appropriate: allergies, current medications, past family history, past medical history, past social history, past surgical history and problem list       Review of Systems   Constitutional: Positive for appetite change (Decreased) and fatigue (In PM)  Negative for chills, diaphoresis and fever  Respiratory: Negative for chest tightness and shortness of breath  Cardiovascular: Negative for chest pain  Gastrointestinal: Negative for abdominal pain, blood in stool, diarrhea, nausea and vomiting  Genitourinary: Negative for dysuria  Current Outpatient Medications   Medication Sig Dispense Refill    B Complex Vitamins (B COMPLEX PO) Take 1 tablet by mouth daily      FLUoxetine (PROzac) 20 MG tablet 1/2 tab by mouth once daily in AM x 1 week, then increase to 1 tab by mouth once daily in AM thereafter  30 tablet 1    MAGNESIUM PO Take 500 mg by mouth daily at bedtime      Multiple Vitamin tablet Take 1 tablet by mouth daily       topiramate (TOPAMAX) 50 MG tablet Take 3 tablets (150 mg total) by mouth 2 (two) times a day 180 tablet 11     No current facility-administered medications for this visit  Objective:  /72   Pulse 95   Temp 98 2 °F (36 8 °C)   Resp 16   Ht 5' 7" (1 702 m)   Wt 98 9 kg (218 lb)   SpO2 98%   BMI 34 14 kg/m²    Wt Readings from Last 3 Encounters:   06/02/22 98 9 kg (218 lb)   04/01/22 95 3 kg (210 lb)   03/31/22 98 7 kg (217 lb 9 6 oz)      BP Readings from Last 3 Encounters:   06/02/22 110/72   03/31/22 114/72   01/26/22 118/80          Physical Exam  Vitals and nursing note reviewed  Constitutional:       Appearance: Normal appearance  She is well-developed  She is obese  HENT:      Head: Normocephalic and atraumatic  Nose:      Right Sinus: No maxillary sinus tenderness or frontal sinus tenderness  Left Sinus: No maxillary sinus tenderness or frontal sinus tenderness  Eyes:      Extraocular Movements: Extraocular movements intact  Conjunctiva/sclera: Conjunctivae normal       Pupils: Pupils are equal, round, and reactive to light  Neck:      Thyroid: No thyromegaly  Cardiovascular:      Rate and Rhythm: Normal rate and regular rhythm  Pulses: Normal pulses  Heart sounds: Normal heart sounds  Pulmonary:      Effort: Pulmonary effort is normal       Breath sounds: Normal breath sounds     Musculoskeletal:         General: No swelling or tenderness  Cervical back: Neck supple  Right lower leg: No edema  Left lower leg: No edema  Neurological:      General: No focal deficit present  Mental Status: She is alert and oriented to person, place, and time  Cranial Nerves: No cranial nerve deficit  Sensory: No sensory deficit  Motor: No weakness  Coordination: Coordination normal       Deep Tendon Reflexes: Reflexes normal    Psychiatric:         Attention and Perception: Attention and perception normal          Mood and Affect: Mood is depressed  Affect is tearful  Behavior: Behavior normal  Behavior is cooperative  Thought Content: Thought content normal          Cognition and Memory: Cognition and memory normal          Judgment: Judgment normal          Lab Results:      Lab Results   Component Value Date    WBC 7 73 09/09/2021    HGB 14 6 09/09/2021    HCT 43 7 09/09/2021     09/09/2021    TRIG 132 09/09/2021    HDL 50 09/09/2021    LDLDIRECT 120 (H) 09/09/2021    ALT 28 09/09/2021    AST 14 09/09/2021    K 3 8 09/09/2021     (H) 09/09/2021    CREATININE 0 92 09/09/2021    BUN 21 09/09/2021    CO2 18 (L) 09/09/2021    GLUF 81 09/09/2021    HGBA1C 4 9 09/09/2021     No results found for: URICACID  Invalid input(s): BASENAME Vitamin D    No results found       POCT Labs

## 2022-06-02 ENCOUNTER — OFFICE VISIT (OUTPATIENT)
Dept: FAMILY MEDICINE CLINIC | Facility: CLINIC | Age: 42
End: 2022-06-02
Payer: COMMERCIAL

## 2022-06-02 VITALS
BODY MASS INDEX: 34.21 KG/M2 | TEMPERATURE: 98.2 F | RESPIRATION RATE: 16 BRPM | WEIGHT: 218 LBS | SYSTOLIC BLOOD PRESSURE: 110 MMHG | HEIGHT: 67 IN | DIASTOLIC BLOOD PRESSURE: 72 MMHG | HEART RATE: 95 BPM | OXYGEN SATURATION: 98 %

## 2022-06-02 DIAGNOSIS — F43.22 ADJUSTMENT DISORDER WITH ANXIETY: Primary | ICD-10-CM

## 2022-06-02 DIAGNOSIS — G40.009 PARTIAL IDIOPATHIC EPILEPSY WITH SEIZURES OF LOCALIZED ONSET, NOT INTRACTABLE, WITHOUT STATUS EPILEPTICUS (HCC): ICD-10-CM

## 2022-06-02 DIAGNOSIS — R25.3 EYELID TWITCH: ICD-10-CM

## 2022-06-02 DIAGNOSIS — E66.9 OBESITY, CLASS I, BMI 30-34.9: ICD-10-CM

## 2022-06-02 PROCEDURE — 99214 OFFICE O/P EST MOD 30 MIN: CPT | Performed by: FAMILY MEDICINE

## 2022-06-02 PROCEDURE — 3725F SCREEN DEPRESSION PERFORMED: CPT | Performed by: FAMILY MEDICINE

## 2022-06-02 PROCEDURE — 3008F BODY MASS INDEX DOCD: CPT | Performed by: FAMILY MEDICINE

## 2022-06-02 PROCEDURE — 1036F TOBACCO NON-USER: CPT | Performed by: FAMILY MEDICINE

## 2022-06-02 RX ORDER — FLUOXETINE 20 MG/1
TABLET, FILM COATED ORAL
Qty: 30 TABLET | Refills: 1 | Status: SHIPPED | OUTPATIENT
Start: 2022-06-02

## 2022-06-02 NOTE — ASSESSMENT & PLAN NOTE
Worsening as now has physical symptoms of eye twitching  Start Prozac 20mg QD  Counseling advised  Smart phone herb list and counseling herb list provided again today

## 2022-06-02 NOTE — PATIENT INSTRUCTIONS
Refer to the back of insurance card for counseling options  Apps and Websites for Counseling, Anxiety/Depression, Chronic Pain, Lifestyle Change, Problem-solving, Self-Esteem, Anger Management, Coping with Uncertainty    (Prices current as of 9/5/19)    Mood Kit - Available in [employer] for $4 99  Supports a person's success in specific situations, includes thought , mood tracker, and journal   Can be accessed in an unstructured way and used as an unguided self-help claudy  2   Moodnotes - Available in [employer] Store for $4 99  Focuses on healthier thinking habits and identifying "traps" in thinking  Tracks mood over a period of time and identifies factors that influence mood  3   MoodMission - Available in [facility] for free  Includes five "missions" to promote confidence in handling stressors and coping in specific situations  The claudy personalizes its style and techniques based on when the user engages it most frequently  In-claudy rewards are used to motivate, increase fun and self-confidence  Helpful for patients who need improvement in mood or a decrease in anxiety and depression symptoms  4    What's Up - Available in [employer] for free  Recognizes negative thinking patterns and methods to overcome them  Uses helpful metaphors, a catastrophe scale, grounding techniques, and breathing exercises  Has the ability to sync data across multiple devices and protect this information with a unique pass code  Has the capability to be active in forums where people can discuss similar feelings and strategies that have been helpful  5   Moodpath - Available in [employer] for free  Uses daily screenings to create a better understanding of thoughts, feelings, and emotions  When needed, it provides a discussion guide to talking with a medical professional based on the responses to daily screenings    Includes over 150 psychological exercises and videos to promote and strengthen overall mental health  6   MindShift - Available in [employer] for free  Helpful for youth and young adults in coping with anxiety  Creates an individualized toolbox to help users deal with test anxiety, perfectionism, social anxiety, worry, panic, and conflict  Includes directions on how to construct belief experiments to trial common beliefs that feed anxiety, guided relaxation, as well as tools and tips to help establish and accomplish goals  Differentiates between helpful and unhelpful anxiety, and explains how to overcome fears by gradually facing them in manageable steps  7   CBT-I  - Available in [employer] for free  For insomnia  Educates about sleep, developing positive sleep routines, and improved sleep environment  Encourages user to change behaviors which will provide confidence for better sleep on a regular basis  8   Neofect uk - Free website  Provides self-help and therapy resources that encourages change to combat negative and destructive thought patterns  Includes access to numerous handouts on a variety of symptoms related to anxiety, depression, low self-esteem, panic attacks, social disorders, and more  The solution section has interventions that can be printed and saved for future use  9   Woebot - Available in [employer] for free  Recommended for age 16+  Friendly self-care  that helps you think through situations with step-by-step guidance using counseling tools, learn about yourself with intelligent mood tracking, and master skills to reduce stress and live happier through 100+ evidence-based stories from clinicians  Chat as often or as little as you'd like, whenever you'd like to  10   Skip:  ELHAM  CBT DBT Chatbot - Available in [employer] appsstore and [employer] Play for free, has in-herb purchases  Recommended for 12+  Psychologist support at $30/month, 50% off to start  Leon Hoffman / Leobardo Pereyra is free  Uses techniques of CBT, DBT, yoga, and meditation to support your needs regarding stress, anxiety, sleep, loss, and a full range of other mental health and wellness issues  11   Curable Pain Relief - Available in [employer] store and [employer] for free, has in-claudy purchases  Teaches about the chronic pain cycle and how to reverse it, while retraining your brain and nervous system for long-term results  Smart  Heather guides user through updates in pain science to identify, target, eliminate the factors that are keeping user "stuck" in the pain cycle  12   [facility] - Available in [employer] store and Google Play for a fee  Subscription service, starting at $59/week (billed monthly)  All plans include unlimited messaging, and add live video sessions if desired  Unlimited messaging therapy for teens ages 15-14 and special services for couples therapy  You can change therapists or stop subscription renewal at any time  Recommended for 13+  User is matched with a licensed therapist in your state and communicate on your device by text, audio, and video from anywhere, at any time  User will hear back at least once a day, 5 days per week  Refer to the back of insurance card for counseling options

## 2022-06-10 ENCOUNTER — TELEPHONE (OUTPATIENT)
Dept: NEUROLOGY | Facility: CLINIC | Age: 42
End: 2022-06-10

## 2022-06-10 DIAGNOSIS — G40.009 PARTIAL IDIOPATHIC EPILEPSY WITH SEIZURES OF LOCALIZED ONSET, NOT INTRACTABLE, WITHOUT STATUS EPILEPTICUS (HCC): Primary | ICD-10-CM

## 2022-06-10 NOTE — TELEPHONE ENCOUNTER
Pt c/o of eye and lip twitching  The eye twitching is not a new symptom but over the last 2 months, it has been happening daily  The lip twitching is new symptom  She went to her eye doctor and said everything is good  She was told that it might be stress related  No new episode of seizures that she is aware of  Current meds  Topamax 50 mg 3 tabs tid  No missed dose  Pt states that the only thing new that she tried was the metformin  She went to weight loss center 3 months ago and they ordered metformin  She only took this for a week but it caused urinary retention so she stopped taking it  No other new meds  Denies recent illness        [phone] ok to leave detailed message

## 2022-06-24 NOTE — TELEPHONE ENCOUNTER
Called pt and advised of the below  States that eye and lip twitching are getting worse  Now, twitching in upper and lower eye and lip  It is happening simultaneously every day every 10 min  Can last about 2 min  She wears sunglasses bec she does not want people to see her  Pt states that she never started fluoxetine  She is also been getting HA on and off  Denies HA at this time   She takes  Topamax 50 mg 3 tabs bid     Placed pt on cancellation list per pt's request

## 2022-06-24 NOTE — TELEPHONE ENCOUNTER
Called and advised pt of all of the below  She verbalized clear understanding of all instructions and agreeable  Pt states that she sleeps well, 7 hrs/night  She used to drink 1 cup of coffee in the AM and 1 cup in the afternoon  She reduced it to 1 cup in the AM  But no change in her symptom  Her mom is sick and this is her main stressor right now       Call transferred to CS

## 2022-07-18 ENCOUNTER — HOSPITAL ENCOUNTER (OUTPATIENT)
Dept: NEUROLOGY | Facility: CLINIC | Age: 42
Discharge: HOME | End: 2022-07-18
Payer: COMMERCIAL

## 2022-07-18 DIAGNOSIS — G40.009 PARTIAL IDIOPATHIC EPILEPSY WITH SEIZURES OF LOCALIZED ONSET, NOT INTRACTABLE, WITHOUT STATUS EPILEPTICUS (HCC): ICD-10-CM

## 2022-07-18 PROCEDURE — 95813 EEG EXTND MNTR 61-119 MIN: CPT

## 2022-07-18 PROCEDURE — 95813 EEG EXTND MNTR 61-119 MIN: CPT | Performed by: PSYCHIATRY & NEUROLOGY

## 2022-07-26 ENCOUNTER — TELEPHONE (OUTPATIENT)
Dept: NEUROLOGY | Facility: CLINIC | Age: 42
End: 2022-07-26

## 2022-07-26 DIAGNOSIS — G40.009 PARTIAL IDIOPATHIC EPILEPSY WITH SEIZURES OF LOCALIZED ONSET, NOT INTRACTABLE, WITHOUT STATUS EPILEPTICUS (HCC): Primary | ICD-10-CM

## 2022-07-26 NOTE — TELEPHONE ENCOUNTER
----- Message from Lima Mata sent at 7/21/2022  8:44 AM EDT -----  EEG was normal  Please see how she has been doing  Has the facial twitching resolved?

## 2022-07-27 NOTE — TELEPHONE ENCOUNTER
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

## 2022-07-27 NOTE — TELEPHONE ENCOUNTER
Given the new symptoms and the focal slowing on her EEG I recommend a Brain MRI  She can increase topiramate to 200 mg twice daily for one week and see if the symptoms improve  Dose can be decreased back down if there is no improvement or if there are side effects  She should call us in one week with an update and a new topiramate Rx can be sent if needed at that time

## 2022-08-12 NOTE — TELEPHONE ENCOUNTER
Called and advised pt of all of the below  She verbalized clear understanding of all instructions  Her brain MRI is scheduled on 9/22/22  Agreeable to increase topiramate to 200 mg bid for one week to see if her symptoms improved  She will give us a call if she develope side effects  If no side effects, she will call us back in a week

## 2022-08-16 ENCOUNTER — TELEPHONE (OUTPATIENT)
Dept: NEUROLOGY | Facility: CLINIC | Age: 42
End: 2022-08-16

## 2022-08-19 ENCOUNTER — LAB (OUTPATIENT)
Dept: LAB | Age: 42
End: 2022-08-19
Payer: COMMERCIAL

## 2022-08-19 ENCOUNTER — OFFICE VISIT (OUTPATIENT)
Dept: NEUROLOGY | Facility: CLINIC | Age: 42
End: 2022-08-19
Payer: COMMERCIAL

## 2022-08-19 VITALS
BODY MASS INDEX: 34.15 KG/M2 | WEIGHT: 217.6 LBS | DIASTOLIC BLOOD PRESSURE: 69 MMHG | SYSTOLIC BLOOD PRESSURE: 115 MMHG | HEIGHT: 67 IN | HEART RATE: 79 BPM

## 2022-08-19 DIAGNOSIS — G24.8 CRANIOFACIAL DYSTONIA: ICD-10-CM

## 2022-08-19 DIAGNOSIS — G51.0 FACIAL PALSY: ICD-10-CM

## 2022-08-19 DIAGNOSIS — G40.009 PARTIAL IDIOPATHIC EPILEPSY WITH SEIZURES OF LOCALIZED ONSET, NOT INTRACTABLE, WITHOUT STATUS EPILEPTICUS (HCC): ICD-10-CM

## 2022-08-19 DIAGNOSIS — G40.009 PARTIAL IDIOPATHIC EPILEPSY WITH SEIZURES OF LOCALIZED ONSET, NOT INTRACTABLE, WITHOUT STATUS EPILEPTICUS (HCC): Primary | ICD-10-CM

## 2022-08-19 LAB
ALBUMIN SERPL BCP-MCNC: 3.9 G/DL (ref 3.5–5)
ALP SERPL-CCNC: 57 U/L (ref 46–116)
ALT SERPL W P-5'-P-CCNC: 25 U/L (ref 12–78)
ANION GAP SERPL CALCULATED.3IONS-SCNC: 8 MMOL/L (ref 4–13)
AST SERPL W P-5'-P-CCNC: 9 U/L (ref 5–45)
BASOPHILS # BLD AUTO: 0.03 THOUSANDS/ΜL (ref 0–0.1)
BASOPHILS NFR BLD AUTO: 0 % (ref 0–1)
BILIRUB SERPL-MCNC: 0.43 MG/DL (ref 0.2–1)
BUN SERPL-MCNC: 15 MG/DL (ref 5–25)
CALCIUM SERPL-MCNC: 8.9 MG/DL (ref 8.3–10.1)
CHLORIDE SERPL-SCNC: 111 MMOL/L (ref 96–108)
CO2 SERPL-SCNC: 21 MMOL/L (ref 21–32)
CREAT SERPL-MCNC: 1 MG/DL (ref 0.6–1.3)
EOSINOPHIL # BLD AUTO: 0.12 THOUSAND/ΜL (ref 0–0.61)
EOSINOPHIL NFR BLD AUTO: 1 % (ref 0–6)
ERYTHROCYTE [DISTWIDTH] IN BLOOD BY AUTOMATED COUNT: 13.2 % (ref 11.6–15.1)
GFR SERPL CREATININE-BSD FRML MDRD: 70 ML/MIN/1.73SQ M
GLUCOSE SERPL-MCNC: 135 MG/DL (ref 65–140)
HCT VFR BLD AUTO: 43.8 % (ref 34.8–46.1)
HGB BLD-MCNC: 14 G/DL (ref 11.5–15.4)
IMM GRANULOCYTES # BLD AUTO: 0.03 THOUSAND/UL (ref 0–0.2)
IMM GRANULOCYTES NFR BLD AUTO: 0 % (ref 0–2)
LYMPHOCYTES # BLD AUTO: 2.45 THOUSANDS/ΜL (ref 0.6–4.47)
LYMPHOCYTES NFR BLD AUTO: 25 % (ref 14–44)
MCH RBC QN AUTO: 29.7 PG (ref 26.8–34.3)
MCHC RBC AUTO-ENTMCNC: 32 G/DL (ref 31.4–37.4)
MCV RBC AUTO: 93 FL (ref 82–98)
MONOCYTES # BLD AUTO: 0.53 THOUSAND/ΜL (ref 0.17–1.22)
MONOCYTES NFR BLD AUTO: 6 % (ref 4–12)
NEUTROPHILS # BLD AUTO: 6.55 THOUSANDS/ΜL (ref 1.85–7.62)
NEUTS SEG NFR BLD AUTO: 68 % (ref 43–75)
NRBC BLD AUTO-RTO: 0 /100 WBCS
PLATELET # BLD AUTO: 277 THOUSANDS/UL (ref 149–390)
PMV BLD AUTO: 11.4 FL (ref 8.9–12.7)
POTASSIUM SERPL-SCNC: 3.4 MMOL/L (ref 3.5–5.3)
PROT SERPL-MCNC: 7.7 G/DL (ref 6.4–8.4)
RBC # BLD AUTO: 4.72 MILLION/UL (ref 3.81–5.12)
SODIUM SERPL-SCNC: 140 MMOL/L (ref 135–147)
TSH SERPL DL<=0.05 MIU/L-ACNC: 1.22 UIU/ML (ref 0.45–4.5)
WBC # BLD AUTO: 9.71 THOUSAND/UL (ref 4.31–10.16)

## 2022-08-19 PROCEDURE — 80201 ASSAY OF TOPIRAMATE: CPT

## 2022-08-19 PROCEDURE — 99215 OFFICE O/P EST HI 40 MIN: CPT | Performed by: NURSE PRACTITIONER

## 2022-08-19 PROCEDURE — 84443 ASSAY THYROID STIM HORMONE: CPT

## 2022-08-19 PROCEDURE — 85025 COMPLETE CBC W/AUTO DIFF WBC: CPT

## 2022-08-19 PROCEDURE — 36415 COLL VENOUS BLD VENIPUNCTURE: CPT

## 2022-08-19 PROCEDURE — 86618 LYME DISEASE ANTIBODY: CPT

## 2022-08-19 PROCEDURE — 80053 COMPREHEN METABOLIC PANEL: CPT

## 2022-08-19 RX ORDER — DIAZEPAM 5 MG/1
TABLET ORAL
Qty: 2 TABLET | Refills: 0 | Status: SHIPPED | OUTPATIENT
Start: 2022-08-19 | End: 2022-09-23 | Stop reason: ALTCHOICE

## 2022-08-19 RX ORDER — TOPIRAMATE 100 MG/1
200 TABLET, FILM COATED ORAL 2 TIMES DAILY
Qty: 360 TABLET | Refills: 3 | Status: SHIPPED | OUTPATIENT
Start: 2022-08-19 | End: 2022-09-08 | Stop reason: SDUPTHER

## 2022-08-19 NOTE — PATIENT INSTRUCTIONS
- Continue current dose of topiramate 200 mg twice per day  - valium added for MRI  - Have you bloodwork done  - Call the office with continued episodes or concerns  - Follow up in 3 months with Dr Justin Celis

## 2022-08-19 NOTE — PROGRESS NOTES
Review of Systems   Constitutional: Negative  Negative for appetite change and fever  HENT: Negative  Negative for hearing loss, tinnitus, trouble swallowing and voice change  Eyes: Negative  Negative for photophobia and pain  Respiratory: Negative  Negative for shortness of breath  Cardiovascular: Negative  Negative for palpitations  Gastrointestinal: Negative  Negative for nausea and vomiting  Endocrine: Negative  Negative for cold intolerance  Genitourinary: Negative  Negative for dysuria, frequency and urgency  Musculoskeletal: Negative  Negative for myalgias and neck pain  Skin: Negative  Negative for rash  Neurological: Negative  Negative for dizziness, tremors, seizures, syncope, facial asymmetry, speech difficulty, weakness, light-headedness, numbness and headaches  Left eye, left side of nose and left side of lip twitching  Hematological: Negative  Does not bruise/bleed easily  Psychiatric/Behavioral: Negative  Negative for confusion, hallucinations and sleep disturbance  All other systems reviewed and are negative

## 2022-08-19 NOTE — PROGRESS NOTES
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 [address] )  Patient with partial idiopathic epilepsy which has been well controlled for several years on topiramate  Recently the patient started to have left eye twitching for which topiramate was increased to 200 mg BID  Eye twitching has now evolved to left facial twitching  EEG revealed occasional delta and theta in the left temporal region which would likely not correlate with eye twitching  There were prior MRI brain studies done with Dr Jose Carlton in the past to follow a left temporal lobe lesion which would also no correlate with left facial twitching  MRI recommended to assess for focal pathology given new onset symptoms but unable to be completed yet due to scheduling difficulty  Given new onset eye twitching which now evolved to facial twitching, called radiology and spoke with Dr Shaq Goddard to request STAT MRI brain be done  This was not approved but did recommend MRI be ordered with sagittal DIR for MS  Reviewed patient also has a slight facial asymmetry of upper face on left side, reportedly worse a few months ago  Lyme, TSH, CBC, CMP, and topiramate levels ordered  Topiramate has seemed to make the facial twitching better as visually it can not be seen by others but she can feel it occurring  This has been very stressful for her on top of other stressful factors at home  Patient will continue topiramate 200 mg BID  Could be increased further if needed  Patient will call the office if symptoms continue Follow up in 3 months or sooner if needed  Facial palsy  Check MRI brain and lyme           She will Return in about 3 months (around 11/19/2022) for follow up with dr Sarai Hansen  Subjective:  Karina Ramirez is a 40 y o  female with epilepsy manifest as GTC seizures and 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 with seizure freedom since about 2013 while on monotherapy with topiramate  , who is returning to Neurology office for follow up of her seizures  Last seen in the office by Dr Alexia De La Paz on 9/15/2021  At that time, her neurologic exam was normal, no clear seizure risk factors and EEG data in the past revealed temporal slowing and possible temporal discharges, semiology suggesting focal epilepsy  She was to continue on topiramate 150 mg BID  Recommended 1 year follow up  The patient  called to report more frequent eye twitching with new symptoms of lip twitching, eye dr told her that this was stress related  She had recently taken metformin from weight management but stopped due to urinary retention  Back in June she reported this was occurring every day every 10 minutes lasting about 2 minutes  Recommended taking a video and sending through my chart as well has having a prolonged one hour EEG  Caffeine was minimal, sleep was good but she was having increased stress as her mother was sick  EEG revealed  Occasional delta and theta in the left temporal region  Dr Alexia De La Paz recommended MRI brain and to increase topiramate to 200 mg BID    She did have the eye twitching only initially  Now it has gone into the left side of her nose and mouth  Her PCP prescribed the prozac but she never took it  She reports that she has continued to gain weight for the past two years without clear reason  Feels that her balance is off  She does get some tingling on her left scalp at times which is an odd sensation  No other issues that she has noticed  The topiramate does seem to be helping  Visually can not see it as much but she can feel it  This is very upsetting to her and she becomes tearful  Her eye left eye looked smaller than the left       Mom has stage 4 ovarian cancer, increased stress  She does need something for her MRI  Does not want to take lorazepam      Takes magnesium, B12, B complex, and collagen  Twice in the last few months she has almost choked  She does have slight facial asymmetry of upper face  She reports that this was worse about 1-2 months ago  Current seizure medications:  - topiramate 200 mg twice  Other medications as per Epic  Event/Seizure semiology:  1  GTC seizure  None for several years  2  Aura involving black and white vision, tunnel vision, racing heart sensation may occur  Precedes #1 by 30 seconds or may rarely occur without progression  None for several years     Special Features  Status epilepticus: no  Self Injury Seizures: yes - falls  Precipitating Factors: none     Epilepsy Risk Factors: Told by Dr Rafaela Curry that she has a lesion in her brain, details uncertain  Normal birth and development  No history of seizures as a child  No family history of seizures  No head trauma resulting in loss of consciousness  No history of stroke or CNS infection       Prior AEDs:  Carbamazepine  Lamotrigine ineffective, forgetful  Levetiracetam ineffective  Pregabalin - couldnt feel feet  Vimpat  Thinks there were others, but does not recall     Prior Evaluation:  Was getting annual MRIs with Dr Rafaela Curry, apparently to follow a lesion in the left temporal lobe  (records requested from [facility],  care everyhwere records in Epic)     Greater than 1 hour EEG: Mildly abnormal EEG in wakefulness and sleep due to the presence of two episodes in drowsiness of focal left temporal slowing (T3 maximal)  48 hour AEEG 11/9/2009 (Dr Velásquez): The patient's noted numbing in face and headache were not associated with epileptiform activity  In comparison with a prior greater than one hour EEG read by myself on 9/30/2009 that study was mildly suggestive of a left temporal abnormality which was not clearly demonstrated during this study     REEG 3/30/2012 (Dr Carline Bob): Normal awake and asleep  REEG 1/2/2013 (Dr Carline Bob): bitemporal independent sharp waves  Prolonged 1 hour EEG 7/18/22: occasional delta and theta activity in the left temporal region  MRI brain seizure scheduled  9/22/22    History Reviewed: The following were reviewed and updated as appropriate: allergies, current medications, past medical history, past social history and problem list     Psychiatric History:  None     Social History:   Driving: Yes  Lives Alone: No  Occupation: [employer]     I reviewed prior neurology notes, EEG reports, most recent labs, as documented in Epic/Videum, and summarized above  Objective:    Blood pressure 115/69, pulse 79, height 5' 7" (1 702 m), weight 98 7 kg (217 lb 9 6 oz), not currently breastfeeding  Physical Exam  No apparent distress  Appears well nourished  Mood appropriate for situation     Neurologic Exam  Mental status- alert and oriented to person, place, and time  Speech appropriate for conversation  Good attention and knowledge  Cranial Nerves- PERRL, VFFTC, EOMS normal, slight upper facial asymmetry, hearing intact bilaterally to finger rubs, tongue midline, palate rise symmetrical, shoulder shrug symmetrical     Motor- No pronator drift  Appropriate strength  Moves all extremities freely  No tremor  Sensory-  Intact distally in all extremities to light touch  DTRs- 2+ and symmetric in all extremities  Gait- normal casual gait  Coordination- FNF intact  ROS:  Review of Systems   Constitutional: Negative  Negative for appetite change and fever  HENT: Negative  Negative for hearing loss, tinnitus, trouble swallowing and voice change  Eyes: Negative  Negative for photophobia and pain  Respiratory: Negative  Negative for shortness of breath  Cardiovascular: Negative  Negative for palpitations  Gastrointestinal: Negative  Negative for nausea and vomiting     Endocrine: Negative  Negative for cold intolerance  Genitourinary: Negative  Negative for dysuria, frequency and urgency  Musculoskeletal: Negative  Negative for myalgias and neck pain  Skin: Negative  Negative for rash  Neurological: Negative  Negative for dizziness, tremors, seizures, syncope, facial asymmetry, speech difficulty, weakness, light-headedness, numbness and headaches  Left eye, left side of nose and left side of lip twitching  Hematological: Negative  Does not bruise/bleed easily  Psychiatric/Behavioral: Negative  Negative for confusion, hallucinations and sleep disturbance  All other systems reviewed and are negative  ROS obtained by MA and reviewed by myself  This note may have been created using voice recognition software  There may be unintentional errors such as grammatical errors, spelling errors, or pronoun errors

## 2022-08-21 LAB — B BURGDOR IGG+IGM SER-ACNC: 0.2 AI

## 2022-08-22 LAB — TOPIRAMATE SERPL-MCNC: 9.7 UG/ML (ref 2–25)

## 2022-09-06 ENCOUNTER — PATIENT MESSAGE (OUTPATIENT)
Dept: NEUROLOGY | Facility: CLINIC | Age: 42
End: 2022-09-06

## 2022-09-06 DIAGNOSIS — G43.109 MIGRAINE WITH AURA AND WITHOUT STATUS MIGRAINOSUS, NOT INTRACTABLE: ICD-10-CM

## 2022-09-06 DIAGNOSIS — G40.409 TONIC-CLONIC EPILEPTIC SEIZURES (HCC): ICD-10-CM

## 2022-09-08 RX ORDER — TOPIRAMATE 100 MG/1
250 TABLET, FILM COATED ORAL 2 TIMES DAILY
Qty: 450 TABLET | Refills: 3 | Status: SHIPPED | OUTPATIENT
Start: 2022-09-08

## 2022-09-08 NOTE — TELEPHONE ENCOUNTER
From: Cherise Ip  To: [facility],[address], [place]  Sent: 9/6/2022 12:10 PM EDT  Subject: Question regarding TSH 3RD GENERATION WITH T4 REFLEX    Rocioe been calling everyday for the mri and hoping I can get in sooner! At first I thought the higher dose of the medicine was helping but its not- its the same

## 2022-09-16 PROBLEM — G51.0 FACIAL PALSY: Status: ACTIVE | Noted: 2022-09-16

## 2022-09-16 NOTE — ASSESSMENT & PLAN NOTE
Patient with partial idiopathic epilepsy which has been well controlled for several years on topiramate  Recently the patient started to have left eye twitching for which topiramate was increased to 200 mg BID  Eye twitching has now evolved to left facial twitching  EEG revealed occasional delta and theta in the left temporal region which would likely not correlate with eye twitching  There were prior MRI brain studies done with Dr Isac Mejia in the past to follow a left temporal lobe lesion which would also no correlate with left facial twitching  MRI recommended to assess for focal pathology given new onset symptoms but unable to be completed yet due to scheduling difficulty  Given new onset eye twitching which now evolved to facial twitching, called radiology and spoke with Dr Viola Humphries to request STAT MRI brain be done  This was not approved but did recommend MRI be ordered with sagittal DIR for MS  Reviewed patient also has a slight facial asymmetry of upper face on left side, reportedly worse a few months ago  Lyme, TSH, CBC, CMP, and topiramate levels ordered  Topiramate has seemed to make the facial twitching better as visually it can not be seen by others but she can feel it occurring  This has been very stressful for her on top of other stressful factors at home  Patient will continue topiramate 200 mg BID  Could be increased further if needed  Patient will call the office if symptoms continue Follow up in 3 months or sooner if needed

## 2022-09-22 ENCOUNTER — HOSPITAL ENCOUNTER (OUTPATIENT)
Dept: RADIOLOGY | Age: 42
Discharge: HOME | End: 2022-09-22
Payer: COMMERCIAL

## 2022-09-22 DIAGNOSIS — G40.009 PARTIAL IDIOPATHIC EPILEPSY WITH SEIZURES OF LOCALIZED ONSET, NOT INTRACTABLE, WITHOUT STATUS EPILEPTICUS (HCC): ICD-10-CM

## 2022-09-22 DIAGNOSIS — G51.0 FACIAL PALSY: ICD-10-CM

## 2022-09-22 PROCEDURE — 70553 MRI BRAIN STEM W/O & W/DYE: CPT

## 2022-09-22 PROCEDURE — G1004 CDSM NDSC: HCPCS

## 2022-09-22 PROCEDURE — A9585 GADOBUTROL INJECTION: HCPCS | Performed by: NURSE PRACTITIONER

## 2022-09-22 RX ADMIN — GADOBUTROL 10 ML: 604.72 INJECTION INTRAVENOUS at 11:35

## 2022-09-23 ENCOUNTER — HOSPITAL ENCOUNTER (OUTPATIENT)
Dept: RADIOLOGY | Age: 42
Discharge: HOME | End: 2022-09-23

## 2022-09-23 DIAGNOSIS — G51.8: Primary | ICD-10-CM

## 2022-09-23 DIAGNOSIS — G40.009 PARTIAL IDIOPATHIC EPILEPSY WITH SEIZURES OF LOCALIZED ONSET, NOT INTRACTABLE, WITHOUT STATUS EPILEPTICUS (HCC): Primary | ICD-10-CM

## 2022-09-23 DIAGNOSIS — G51.0 FACIAL PALSY: ICD-10-CM

## 2022-09-23 DIAGNOSIS — G51.32 HEMIFACIAL SPASM OF LEFT SIDE OF FACE: ICD-10-CM

## 2022-09-23 DIAGNOSIS — G40.009 PARTIAL IDIOPATHIC EPILEPSY WITH SEIZURES OF LOCALIZED ONSET, NOT INTRACTABLE, WITHOUT STATUS EPILEPTICUS (HCC): ICD-10-CM

## 2022-09-23 RX ORDER — CLONAZEPAM 0.5 MG/1
0.5 TABLET, ORALLY DISINTEGRATING ORAL 2 TIMES DAILY PRN
Qty: 20 TABLET | Refills: 0 | Status: SHIPPED | OUTPATIENT
Start: 2022-09-23

## 2022-09-29 ENCOUNTER — CONSULT (OUTPATIENT)
Dept: NEUROSURGERY | Facility: CLINIC | Age: 42
End: 2022-09-29
Payer: COMMERCIAL

## 2022-09-29 VITALS
BODY MASS INDEX: 33.74 KG/M2 | HEART RATE: 78 BPM | DIASTOLIC BLOOD PRESSURE: 78 MMHG | RESPIRATION RATE: 14 BRPM | HEIGHT: 67 IN | WEIGHT: 215 LBS | SYSTOLIC BLOOD PRESSURE: 112 MMHG | TEMPERATURE: 98.2 F

## 2022-09-29 DIAGNOSIS — G51.32 HEMIFACIAL SPASM OF LEFT SIDE OF FACE: ICD-10-CM

## 2022-09-29 PROCEDURE — 99203 OFFICE O/P NEW LOW 30 MIN: CPT | Performed by: NEUROLOGICAL SURGERY

## 2022-09-29 NOTE — PROGRESS NOTES
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                Discussion:  Patient is a 50-year-old female with h/o epilepsy (followed by Dr Marquita Phillips) who p/w 1-2 years of intermittent left facial twitching, usually involving eyelids and left nares, initially also involving right eyelids with transient paresthesias (no pain or persistent numbness) as well as episodes of intermittent blurred vision ("things look moon shaped"), gait imbalance, vertigo, and word-finding difficulties  No pertinent family history (came with mother)  Underwent Botox injection by dermatologist in June 2022 with near-complete relief of these symptoms for ~3 months  Recently (about a month ago) increased Topamax, which also helped with symptom control  Not taking AC  Non-smoker  MRI on 9/22/22, including FIESTA sequences, showed intact left facial nerve with adjacent vessels such as AICA looping and traversing near the nerve without significant compression or abnormal flattening (there is clear CSF signal around the entire nerve from dorsal root entry zone [facility] to [facility])  Given unremarkable MRI, I do not recommend neurosurgical intervention, more specifically microvascular decompression, as there is no radiographic evidence of significant vascular compression of the left facial nerve  Continue medical management per Neurology including other medications as well as repeat Botox injections  All questions and concerns were addressed during this visit            CHIEF COMPLAINT    Chief Complaint   Patient presents with    Consult       HISTORY    History of Present Illness     40y o  year old female     HPI    See Discussion    REVIEW OF SYSTEMS    Review of Systems   Eyes: Positive for visual disturbance (sometimes- Blurred vision (sometimes the ground looks like a half moon)  Musculoskeletal: Positive for gait problem (increasing falls/balance is off)  Neurological: Positive for seizures (takes topamax-non recent ), speech difficulty (trouble finding words) and headaches (occasional )  No previous brain sx    Chief Complaint: MRI findings correlate for signs and symptoms of ipsilateral microvascular  HA, h/o seizures pt on Topamax, confusion/memory loss  Increasing falls/balance is off  Blurred vision ( sometimes the ground looks like a half moon)-trouble finding words x 1 year     neurology ov 8/19/22   Hematological:        Neg AC    Psychiatric/Behavioral: Positive for confusion (memory loss-mom states yes)  All other systems reviewed and are negative  Meds/Allergies     Current Outpatient Medications   Medication Sig Dispense Refill    B Complex Vitamins (B COMPLEX PO) Take 1 tablet by mouth daily      clonazePAM (KlonoPIN) 0 5 MG disintegrating tablet Take 1 tablet (0 5 mg total) by mouth 2 (two) times a day as needed for seizures (facial twitching) 20 tablet 0    MAGNESIUM PO Take 500 mg by mouth daily at bedtime      Multiple Vitamin tablet Take 1 tablet by mouth daily       topiramate (TOPAMAX) 100 mg tablet Take 2 5 tablets (250 mg total) by mouth 2 (two) times a day 450 tablet 3     No current facility-administered medications for this visit         Allergies   Allergen Reactions    Nuts - Food Allergy Throat Swelling and Edema     Tree nuts    Sulfa Antibiotics GI Intolerance and Seizures       PAST HISTORY    Past Medical History:   Diagnosis Date    Adjustment disorder with anxiety     Class 1 obesity without serious comorbidity with body mass index (BMI) of 32 0 to 32 9 in adult 04/14/2021    Female infertility     Migraine with aura and without status migrainosus, not intractable 02/27/2018    Other hyperlipidemia     Seizures ([facility] [address] )     last seizures 5  years ago    Tonic-clonic epileptic seizures ([facility] [address] ) 01/11/2017    Varicella     childhood       Past Surgical History:   Procedure Laterality Date    BUNIONECTOMY Right      SECTION      DE  DELIVERY ONLY N/A 2016    Procedure:  SECTION () REPEAT;  Surgeon: Eve Gibbons MD;  Location: [facility] ;  Service: Obstetrics    DE LIGATE FALLOPIAN TUBE Bilateral 2016    Procedure: LIGATION/COAGULATION TUBAL;  Surgeon: Eve Gibbons MD;  Location: [facility];  Service: Obstetrics       Social History     Tobacco Use    Smoking status: Never Smoker    Smokeless tobacco: Never Used   Vaping Use    Vaping Use: Never used   Substance Use Topics    Alcohol use: Not Currently    Drug use: No       Family History   Problem Relation Age of Onset    Diabetes type I Father     Learning disabilities Sister     Depression Sister     Hyperlipidemia Sister     Asthma Brother     Hyperlipidemia Brother     Breast cancer Maternal Aunt 73        x 2    Thyroid disease Maternal Aunt     Alcohol abuse Paternal Aunt     Diabetes Paternal Aunt     Mental illness Maternal Grandmother     Bipolar disorder Maternal Grandmother     Colon cancer Maternal Grandmother 35        Colon cancer    Alcohol abuse Maternal Grandfather     Cancer Maternal Grandfather 52        Colon, stomach throat cancer - Unsure of primary cancer    Diabetes unspecified Paternal Grandmother     Hyperlipidemia Mother     Ovarian cancer Mother 62    BRCA1 Negative Mother     Mental illness Maternal Uncle     Bipolar disorder Maternal Uncle     No Known Problems Daughter     No Known Problems Daughter     No Known Problems Paternal Grandfather     Hypertension Neg Hx     Heart disease Neg Hx     Stroke Neg Hx          The following portions of the patient's history were reviewed in this encounter and updated as appropriate: Past medical, surgical, family, and social history, as well as medications, allergies, and review of systems          EXAM    Vitals:Blood pressure 112/78, pulse 78, temperature 98 2 °F (36 8 °C), temperature source Temporal, resp  rate 14, height 5' 7" (1 702 m), weight 97 5 kg (215 lb), not currently breastfeeding  ,There is no height or weight on file to calculate BMI  Physical Exam  Constitutional:       Appearance: Normal appearance  HENT:      Head: Normocephalic and atraumatic  Eyes:      Extraocular Movements: Extraocular movements intact and EOM normal       Pupils: Pupils are equal, round, and reactive to light  Cardiovascular:      Rate and Rhythm: Normal rate and regular rhythm  Pulses: Normal pulses  Heart sounds: Normal heart sounds  Pulmonary:      Effort: Pulmonary effort is normal       Breath sounds: Normal breath sounds  Abdominal:      General: Abdomen is flat  Palpations: Abdomen is soft  Musculoskeletal:         General: Normal range of motion  Cervical back: Normal range of motion  Skin:     General: Skin is warm  Neurological:      General: No focal deficit present  Mental Status: She is alert and oriented to person, place, and time  Mental status is at baseline  Gait: Gait is intact  Deep Tendon Reflexes: Strength normal    Psychiatric:         Mood and Affect: Mood normal          Speech: Speech normal          Behavior: Behavior normal          Neurologic Exam     Mental Status   Oriented to person, place, and time  Attention: normal    Speech: speech is normal   Level of consciousness: alert    Cranial Nerves     CN II   Visual fields full to confrontation  Visual acuity: normal  Right visual field deficit: none  Left visual field deficit: none     CN III, IV, VI   Pupils are equal, round, and reactive to light  Extraocular motions are normal    CN III: no CN III palsy  CN VI: no CN VI palsy  Nystagmus: none   Diplopia: none  Ophthalmoparesis: none  Upgaze: normal  Downgaze: normal  Conjugate gaze: present    CN V   Facial sensation intact     Right facial sensation deficit: none  Left facial sensation deficit: none    CN VII   Facial expression full, symmetric  Right facial weakness: none  Left facial weakness: none    CN VIII   CN VIII normal      CN IX, X   CN IX normal    CN X normal    Palate: symmetric    CN XI   CN XI normal    Right sternocleidomastoid strength: normal  Left sternocleidomastoid strength: normal  Right trapezius strength: normal  Left trapezius strength: normal    CN XII   CN XII normal    Tongue deviation: none  Currently no left facial numbness/paresthesias or weakness/spasms in all distributions     Motor Exam   Muscle bulk: normal  Overall muscle tone: normal  Right arm pronator drift: absent  Left arm pronator drift: absent    Strength   Strength 5/5 throughout  Sensory Exam   Light touch normal      Gait, Coordination, and Reflexes     Gait  Gait: normal    Reflexes   Reflexes 2+ except as noted  MEDICAL DECISION MAKING    Imaging Studies:     MRI brain seizure wo and w contrast    Addendum Date: 9/23/2022 Addendum:   ADDENDUM: Additional information was provided by the referring nurse practitioner after the original interpretation  The patient has left-sided hemifacial spasm and a request was made for additional imaging of the left 7th nerve  The patient was asked to return to the MRI department and thin section heavily T2-weighted imaging was performed at no additional charge  Axial fiesta and 3-D cube T2 imaging demonstrated 2 vessels in the left CP angle and the pars acusticus region along the cisternal and canalicular segments of the left 7th nerve  The anterior inferior cerebellar artery loop appears to extend into the porus acusticus (3:55) while the additional vessel traversing in the anterior and posterior direction traverses the proximal cisternal segment of the  7th cranial nerve  (3:51)  Correlate for signs and symptoms of ipsilateral microvascular compression      Result Date: 9/23/2022  Narrative: MRI  BRAIN  - WITH AND WITHOUT CONTRAST INDICATION: G40 009: Localization-related (focal) (partial) idiopathic epilepsy and epileptic syndromes with seizures of localized onset, not intractable, without status epilepticus G51 0: Bell's palsy  Seizure disorder  COMPARISON: MRI dated 1/20/2019  TECHNIQUE:  Sagittal BRAVO, axial T2, axial FLAIR, axial T1, axial DWI, axial [place], coronal T2, and coronal FLAIR  Post-contrast axial T1, and coronal BRAVO  Imaging performed on 3 0T MRI  IV Contrast:  10 mL of Gadobutrol injection (SINGLE-DOSE) IMAGE QUALITY:   Diagnostic  FINDINGS: BRAIN PARENCHYMA:  There is no discrete mass, mass effect or midline shift  Brainstem and cerebellum demonstrate normal signal  There is no intracranial hemorrhage  There is no evidence of acute infarction and diffusion imaging is unremarkable  There are no white matter changes in the cerebral hemispheres  Symmetric hippocampal formations with regard to size and signal  Postcontrast imaging of the brain demonstrates no abnormal enhancement  Tiny left frontal developmental venous anomaly is incidentally noted  No associated cavernoma  VENTRICLES:  Normal  SELLA AND PITUITARY GLAND:  Normal  ORBITS:  Normal  PARANASAL SINUSES:  Normal  VASCULATURE:  Evaluation of the major intracranial vasculature demonstrates appropriate flow voids  CALVARIUM AND SKULL BASE:  Normal  EXTRACRANIAL SOFT TISSUES:  Normal      Impression: Unremarkable MRI brain  Workstation performed: [id number]     MRI follow up neuro    Result Date: 9/23/2022  Narrative: Please see the addendum issued to accession [id number]  Workstation performed: [id number]       I have personally reviewed pertinent films in [place]    Neurosurgeon

## 2022-10-04 ENCOUNTER — TELEPHONE (OUTPATIENT)
Dept: NEUROLOGY | Facility: CLINIC | Age: 42
End: 2022-10-04

## 2022-10-04 NOTE — TELEPHONE ENCOUNTER
Patient was evaluated by neurosurgery - recommendation was to continue botox with derm and follow up with our office per consultation note  Patient is scheduled for follow up in December, asking if there are any medication options for facial twitching  She is also interested in starting botox with our office vs derm  Spoke to patient  States facial twitching just doesn't stop  Nothing alleviates or worsens symptoms  Patient states she is frustrated that neurosurgery appt was a "waste of time"  Patient aware Mardee Knife is out of office

## 2022-10-20 NOTE — TELEPHONE ENCOUNTER
Panda Mendez MD  to Ry Roque • Me       3:30 PM  Is there someone in our practice that would do botox to address facial twitching? Previously had successful botox with derm

## 2022-11-08 NOTE — TELEPHONE ENCOUNTER
Ronny, should this patient be seen in the office by Dr Yoav Greene prior to initiating the authorization for Botox? The office office note from 8/19 does mention the facial twitch but wanted to confirm that this is enough information to initiate the authorization

## 2023-01-10 ENCOUNTER — TELEMEDICINE (OUTPATIENT)
Dept: NEUROLOGY | Facility: CLINIC | Age: 43
End: 2023-01-10

## 2023-01-10 ENCOUNTER — TELEPHONE (OUTPATIENT)
Dept: NEUROLOGY | Facility: CLINIC | Age: 43
End: 2023-01-10

## 2023-01-10 DIAGNOSIS — G51.32 HEMIFACIAL SPASM OF LEFT SIDE OF FACE: Primary | ICD-10-CM

## 2023-01-10 NOTE — ASSESSMENT & PLAN NOTE
Patient with reports of a 1-2 history of left-sided facial spasm/twitching involving the muscles surrounding the left eye, nasalis, upper cheek, and mouth  As are variable and intermittent  Previously underwent Botox with dermatology with injections to bilateral orbicularis oculi muscles Temporalis with partial resolution lasting about 6 months  On exam she had very transient twitching of the left eye  No involvement of the lower face  She does report having videos of episodes with involvement of the entire left face  Prior the brain was reviewed and was unremarkable  Continued injections were recommended prior to any neurosurgical intervention  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 with partial improvement with Botox injections  She wishes to proceed with continued injections  We discussed injections would be performed solely on the left side of the face  Based on today's exam we would start with level be Kirstin oculi muscles  She will bring in videos to show during the exam and we can consider lower facial injections if indicated  We discussed the potential benefits and risks of neurotoxin injections   Injections are to treat symptoms  Potential side effects such as excessive weakness asymmetry of facial movements, spread of toxin,  infection and localized bleeding and bruising discussed  Need for continued injection every 3-4 months discussed  They can be spread further apart if injections are lasting longer  wishes to proceed with left-sided facial injections      Injection plan: Botox or Xeomin   Left obicularis oculi -  1 25units units x 4 (inner and outer upper lid x 2, lower lid x 2)   2 5 units x 1 (lateral)      Consider zygomaticus and nasalis if involved on video or at visit

## 2023-01-26 NOTE — TELEPHONE ENCOUNTER
Nancie Birmingham MD  Neurology Botox; Ji Lopez MA 2 weeks ago      start injections   Left hemifacial spasm   Botox 100 units vital ( 2cc NS)   Or Xeomin 50 unit vial (1cc NS) which ever is covered for this indication  Injection plan:     Left obicularis oculi -   1 25units units x 4 (inner and outer upper lid x 2, lower lid x 2)   2 5 units x 1 (lateral)     Consider zygomaticus and nasalis if involved on video or at visit   Max total 20 units      Patient is a new start to Botox, as per requested for Botox you would like to start this patient on:     Botox 100 UNITS  Qty  1  DX  G51 32  Sig: Inject up to 200 UNITS I M into the various sites in the head and neck once every three months for one year with  Refills: 3     If you agree to this order transcribed above please respond directly if you agree or not, so I may proceed further with authorization and for verbal order  Thank you

## 2023-02-09 NOTE — TELEPHONE ENCOUNTER
January 27, 2023  Georgina Ledezma MD  to Deborah Rouse LPN     3:08 AM  Agree  expect it should read, inject up to 100 units (not 200 units)   thanks Statement Selected

## 2023-02-09 NOTE — TELEPHONE ENCOUNTER
Insurance: [facility] Plan  ID# [id number]  # [phone]     Called Provider Prior-Authorization  and spoke to Maya Sanchez in the [facility] Dept to request Prior-Auth for patients Botox request Maya Sanchez provided me with the following info:     Vp-[id number] for:  HCPS: [id number] or CPT: 03619  Botox-100 units  DX: G51 32  Call Ref# [id number]     Please use our Stock  Please reach out to patient and schedule them for the soonest Botox-Appt available  Thank you

## 2023-04-11 ENCOUNTER — TELEPHONE (OUTPATIENT)
Dept: NEUROLOGY | Facility: CLINIC | Age: 43
End: 2023-04-11

## 2023-04-11 NOTE — TELEPHONE ENCOUNTER
Called pt and LMOM stating that I am calling in regards to offering a BINJ appt as Dr Herbert Barriga currently has openings in her schedule  I then asked the patient to please give us a call back to get this appt scheduled

## 2023-04-13 NOTE — TELEPHONE ENCOUNTER
Pt called informed her the appointment Jd Mccall was offering was for yesterday  But if Dr Pamela Mar has another an appointment available patient is willing to travel to [facility]   Please give her a call back at [phone]

## 2023-04-19 ENCOUNTER — TELEPHONE (OUTPATIENT)
Dept: NEUROLOGY | Facility: CLINIC | Age: 43
End: 2023-04-19

## 2023-04-19 NOTE — TELEPHONE ENCOUNTER
Called pt and LMOM stating that I am calling in regards to scheduling a [address]  appt with Dr Miriam Long in the [facility] location  I then asked the patient to please give us a call to schedule  Currently looking at scheduling patient on 05/10/2023 or 05/11/2023

## 2023-04-19 NOTE — TELEPHONE ENCOUNTER
Called pt and LMOM stating that I am calling in regards to scheduling a [address]  appt with Dr Arvis Siemens in the [facility] location  I then asked the patient to please give us a call to schedule  Currently looking at scheduling patient on 05/10/2023 or 05/11/2023

## 2023-07-25 ENCOUNTER — ANNUAL EXAM (OUTPATIENT)
Dept: OBGYN CLINIC | Facility: CLINIC | Age: 43
End: 2023-07-25
Payer: COMMERCIAL

## 2023-07-25 VITALS
BODY MASS INDEX: 33.74 KG/M2 | HEIGHT: 67 IN | WEIGHT: 215 LBS | DIASTOLIC BLOOD PRESSURE: 70 MMHG | SYSTOLIC BLOOD PRESSURE: 128 MMHG

## 2023-07-25 DIAGNOSIS — Z12.31 ENCOUNTER FOR SCREENING MAMMOGRAM FOR BREAST CANCER: ICD-10-CM

## 2023-07-25 DIAGNOSIS — N92.0 MENORRHAGIA WITH REGULAR CYCLE: ICD-10-CM

## 2023-07-25 DIAGNOSIS — Z01.419 WOMEN'S ANNUAL ROUTINE GYNECOLOGICAL EXAMINATION: Primary | ICD-10-CM

## 2023-07-25 PROCEDURE — 99396 PREV VISIT EST AGE 40-64: CPT | Performed by: OBSTETRICS & GYNECOLOGY

## 2023-07-25 NOTE — PROGRESS NOTES
Assessment/Plan:    The patient was informed of a stable GYN examination. We are concerned about her increasing menstrual cycles. We will make arrangements for an ultrasound. She did lose her mother to ovarian cancer recently. She is somewhat concerned about the possibility of going to do it herself. She will return to my office in 2 to 3 weeks for an endometrial biopsy if the endometrial biopsy is negative we may consider using birth control pills to control her cycles. She will continue doing yearly mammograms. She feels safe at home. She will continue to monitor her weight. Subjective:      Patient ID: Radha Osorio is a 44 y.o. female. HPI    This is a 81-year-old white female, she is a  2 para 2 with 2 prior  section. Her current method of contraception includes a tubal ligation. Her menstrual cycles are regular but sometimes heavy will bleeding over 8 days. This is now interfering with her lifestyle. We will make arrangements for an ultrasound looking for fibroids. She has a history of a seizure disorder currently on Topamax. She sees neurology on a regular basis. She has not had a seizure in quite a while. Unfortunately she lost her mother within the last year from ovarian cancer. She still dealing with the loss. She is concerned she may go for the same thing with her children. She feels safe at home. She sees a dentist on a regular basis. She would like to lose more weight. She also admits to stress urine incontinence which is under control. The following portions of the patient's history were reviewed and updated as appropriate: allergies, current medications, past family history, past medical history, past social history, past surgical history and problem list.    Review of Systems   Genitourinary: Positive for menstrual problem. All other systems reviewed and are negative.         Objective:      /70   Ht 5' 7" (1.702 m)   Wt 97.5 kg (215 lb)   LMP 2023 (Exact Date)   BMI 33.67 kg/m²          Physical Exam  Vitals reviewed. Exam conducted with a chaperone present. Constitutional:       Appearance: Normal appearance. HENT:      Head: Normocephalic and atraumatic. Mouth/Throat:      Mouth: Mucous membranes are moist.   Cardiovascular:      Rate and Rhythm: Normal rate and regular rhythm. Pulses: Normal pulses. Heart sounds: Normal heart sounds. Pulmonary:      Effort: Pulmonary effort is normal.      Breath sounds: Normal breath sounds. Chest:   Breasts:     Breasts are symmetrical.      Right: Normal.      Left: Normal.   Abdominal:      General: Abdomen is flat. A surgical scar is present. Bowel sounds are normal.      Palpations: Abdomen is soft. There is no hepatomegaly or splenomegaly. Hernia: No hernia is present. There is no hernia in the left inguinal area or right inguinal area. Comments:  section scar well-healed x2   Genitourinary:     General: Normal vulva. Pubic Area: No rash or pubic lice. Labia:         Right: No rash or tenderness. Left: No rash or tenderness. Urethra: No prolapse or urethral pain. Vagina: Normal.      Cervix: Normal.      Uterus: Normal.       Adnexa: Right adnexa normal and left adnexa normal.      Rectum: Normal.      Comments: The external genitalia normal limits the vagina is clean cervix is closed a Pap smear was not performed. Uterus is normal in size and consistency adnexa clear. A Pap smear was not performed there is no evidence of prolapse. Musculoskeletal:         General: Normal range of motion. Cervical back: Normal range of motion and neck supple. Skin:     General: Skin is warm and dry. Neurological:      General: No focal deficit present. Mental Status: She is alert and oriented to person, place, and time.    Psychiatric:         Mood and Affect: Mood normal.         Behavior: Behavior normal. Thought Content:  Thought content normal.

## 2023-07-25 NOTE — PATIENT INSTRUCTIONS
The patient was informed of a stable GYN examination. She is concerned about her heavy menstrual cycles. We will make arrangements for an ultrasound to see if there are any endometrial thickness and changes. We will also return the office in 2 to 3 weeks for endometrial biopsy. If the ultrasound and biopsy are negative we may consider controlling her menstrual cycles of birth control pills. She is interested in a hysterectomy accidents not the correct thing to do this proper time.

## 2023-08-10 ENCOUNTER — HOSPITAL ENCOUNTER (OUTPATIENT)
Dept: RADIOLOGY | Age: 43
Discharge: HOME | End: 2023-08-10
Payer: COMMERCIAL

## 2023-08-10 DIAGNOSIS — N92.0 MENORRHAGIA WITH REGULAR CYCLE: ICD-10-CM

## 2023-08-10 PROCEDURE — 76856 US EXAM PELVIC COMPLETE: CPT

## 2023-08-10 PROCEDURE — 76830 TRANSVAGINAL US NON-OB: CPT

## 2023-08-17 ENCOUNTER — TELEPHONE (OUTPATIENT)
Dept: OTHER | Facility: OTHER | Age: 43
End: 2023-08-17

## 2023-09-11 ENCOUNTER — PROCEDURE VISIT (OUTPATIENT)
Dept: OBGYN CLINIC | Facility: CLINIC | Age: 43
End: 2023-09-11
Payer: COMMERCIAL

## 2023-09-11 VITALS
WEIGHT: 226 LBS | BODY MASS INDEX: 35.47 KG/M2 | HEIGHT: 67 IN | SYSTOLIC BLOOD PRESSURE: 116 MMHG | DIASTOLIC BLOOD PRESSURE: 74 MMHG

## 2023-09-11 DIAGNOSIS — N92.0 MENORRHAGIA WITH REGULAR CYCLE: Primary | ICD-10-CM

## 2023-09-11 PROCEDURE — 58100 BIOPSY OF UTERUS LINING: CPT | Performed by: OBSTETRICS & GYNECOLOGY

## 2023-09-11 NOTE — PROGRESS NOTES
Endometrial biopsy    Date/Time: 9/11/2023 11:30 AM    Performed by: Sourav Finn MD  Authorized by: Sourav Finn MD  Universal Protocol:  Consent: Verbal consent obtained. Risks and benefits: risks, benefits and alternatives were discussed  Consent given by: patient  Timeout called at: 9/11/2023 11:30 AM.  Patient understanding: patient states understanding of the procedure being performed  Patient consent: the patient's understanding of the procedure matches consent given  Procedure consent: procedure consent matches procedure scheduled  Relevant documents: relevant documents present and verified  Test results: test results available and properly labeled  Site marked: the operative site was not marked  Radiology Images displayed and confirmed. If images not available, report reviewed: imaging studies not available  Patient identity confirmed: verbally with patient      Indication:     Indications: Other disorder of menstruation and other abnormal bleeding from female genital tract    Procedure:     Procedure: endometrial biopsy with Pipelle      A bivalve speculum was placed in the vagina: yes      Cervix cleaned and prepped: yes      A paracervical block was performed: no      An intracervical block was performed: no      Uterus sounded: yes      Uterus sound depth (cm):  9    Specimen collected: specimen collected and sent to pathology      Patient tolerated procedure well with no complications: yes    Findings:     Uterus size:  Non-gravid    Cervix: normal      Adnexa: normal    Comments:     Procedure comments: The patient tolerated the procedure well. There is no evidence of perforation. She will be informed the results of the biopsy when it returns. We talked about options to control the bleeding for recurrent we talked about birth control pills, the IUD, and the possibility of ablation. At this time she would just wants to watch and wait. We will abide by her wishes.

## 2023-09-11 NOTE — PATIENT INSTRUCTIONS
The patient tolerated procedure well. There is no evidence of perforation. She will be informed the results when they return. Our options for therapy include observation, birth control pill, IUD, endometrial ablation. At the present time she declines do anything but just watch.

## 2023-09-14 LAB
CLINICAL INFO: NORMAL
PATH REPORT.COMMENTS IMP SPEC: NORMAL
SPECIMEN SOURCE: NORMAL

## 2023-10-05 ENCOUNTER — HOSPITAL ENCOUNTER (OUTPATIENT)
Dept: RADIOLOGY | Age: 43
Discharge: HOME | End: 2023-10-05
Payer: COMMERCIAL

## 2023-10-05 DIAGNOSIS — Z12.31 ENCOUNTER FOR SCREENING MAMMOGRAM FOR MALIGNANT NEOPLASM OF BREAST: ICD-10-CM

## 2023-10-05 DIAGNOSIS — Z12.31 ENCOUNTER FOR SCREENING MAMMOGRAM FOR BREAST CANCER: ICD-10-CM

## 2023-10-05 PROCEDURE — 77063 BREAST TOMOSYNTHESIS BI: CPT

## 2023-10-05 PROCEDURE — 77067 SCR MAMMO BI INCL CAD: CPT

## 2023-10-16 DIAGNOSIS — G43.109 MIGRAINE WITH AURA AND WITHOUT STATUS MIGRAINOSUS, NOT INTRACTABLE: ICD-10-CM

## 2023-10-16 DIAGNOSIS — G40.409 TONIC-CLONIC EPILEPTIC SEIZURES (HCC): ICD-10-CM

## 2023-10-16 RX ORDER — TOPIRAMATE 100 MG/1
TABLET, FILM COATED ORAL
Qty: 450 TABLET | Refills: 3 | Status: SHIPPED | OUTPATIENT
Start: 2023-10-16

## 2024-04-09 ENCOUNTER — TELEPHONE (OUTPATIENT)
Age: 44
End: 2024-04-09

## 2024-04-11 ENCOUNTER — OFFICE VISIT (OUTPATIENT)
Age: 44
End: 2024-04-11

## 2024-04-11 ENCOUNTER — TRANSCRIBE ORDERS (OUTPATIENT)
Dept: GASTROENTEROLOGY | Facility: CLINIC | Age: 44
End: 2024-04-11

## 2024-04-11 ENCOUNTER — APPOINTMENT (OUTPATIENT)
Dept: LAB | Age: 44
End: 2024-04-11
Payer: COMMERCIAL

## 2024-04-11 VITALS
TEMPERATURE: 97.6 F | BODY MASS INDEX: 35.06 KG/M2 | SYSTOLIC BLOOD PRESSURE: 120 MMHG | WEIGHT: 223.4 LBS | HEART RATE: 85 BPM | DIASTOLIC BLOOD PRESSURE: 80 MMHG | HEIGHT: 67 IN

## 2024-04-11 DIAGNOSIS — E66.9 OBESITY, CLASS I, BMI 30-34.9: Primary | ICD-10-CM

## 2024-04-11 DIAGNOSIS — E66.9 OBESITY, CLASS I, BMI 30-34.9: ICD-10-CM

## 2024-04-11 DIAGNOSIS — E78.49 OTHER HYPERLIPIDEMIA: ICD-10-CM

## 2024-04-11 LAB
ATRIAL RATE: 69 BPM
P AXIS: 49 DEGREES
PR INTERVAL: 186 MS
QRS AXIS: 21 DEGREES
QRSD INTERVAL: 80 MS
QT INTERVAL: 428 MS
QTC INTERVAL: 458 MS
T WAVE AXIS: 39 DEGREES
VENTRICULAR RATE: 69 BPM

## 2024-04-11 PROCEDURE — 93010 ELECTROCARDIOGRAM REPORT: CPT | Performed by: INTERNAL MEDICINE

## 2024-04-11 NOTE — PROGRESS NOTES
Assessment/Plan:    1. Obesity, Class I, BMI 30-34.9  ECG 12 lead      2. Other hyperlipidemia              - Weight not at goal  - Patient is interested in Conservative Program  - Labs reviewed: As below.    General Recommendations:  Nutrition:  Eat breakfast daily.  Do not skip meals.     Food log (ie.) www.Sensics.com, sparkpeople.com, loseit.com, Genterpret.com, etc.    Practice mindful eating.  Be sure to set aside time to eat, eat slowly, and savor your food.    Hydration:    At least 64oz of water daily.  No sugar sweetened beverages.  No juice (eat the fruit instead).    Exercise:  Studies have shown that the ideal exercise goal is somewhere between 150 to 300 minutes of moderate intensity exercise a week.  Start with exercising 10 minutes every other day and gradually increase physical activity with a goal of at least 150 minutes of moderate intensity exercise a week, divided over at least 3 days a week.  An example of this would be exercising 30 minutes a day, 5 days a week.  Resistance training can increase muscle mass and increase our resting metabolic rate.   FULL BODY resistance training is recommended 2-3 times a week.  Do not do this on consecutive days to allow for muscle recovery.    Aim for a bare minimum 5000 steps, even on days you do not exercise.    Monitoring:   Weigh yourself daily.  If this causes undue stress, then just weigh yourself once a week.  Weigh yourself the same time of the day with the same amount of clothing on.  Preferably this should be done after waking up, before you eat, and with no clothing or minimal clothing on.    Specific Goals:  Goal protein intake of 60-80 grams per day  No sugary beverages. At least 64oz of water daily.  Gradually increase physical activity to a goal of 5 days per week for 30 minutes of MODERATE intensity PLUS 2 days per week of FULL BODY resistance training    Calorie goal:  5315-1515 rickey/day    Return visit:    Calorie tracking  Exercise -  walking 2x daily. Add weight training.  AOM  Already on topamax for migraines  Discussed adding phentermine to mimic contrave.   Start weight 223 lbs (4/11/24)  Denies any cardiac history, glaucoma, insomnia.   Will update EKG  Potential side effects of Phentermine: increased heart rate, increased blood pressure, palpitations, headache, dizziness, insomnia, altered mood, abdominal upset, and dry mouth. Notify the provider with change in mood. Please go to the emergency room if you develop thoughts of harming yourself or others. Phentermine should be stopped 2 weeks prior to surgery. Notify the provider with any changes in vision  After starting or increasing dose of phentermine it is advised that you check your resting blood pressure and pulse at least 3 times per week for a month.  Vary the time of day and record these results.  If you have any resting blood pressures above 140/90 or heart rate above 100 beats per minute, then you must notify your weight loss specialist and primary care doctor.   Metabolism testing  RTC 2 months           Subjective:   Excess weight       Patient ID: Freida Zepeda  is a 43 y.o. female with excess weight/obesity here to pursue weight managment.  Patient is pursuing Conservative Program with        Started IF  Metformin previously, caused constipation per patient report  Maintains on topamax d/t migraines/     Obesity/Excess Weight:  Severity: Moderate  Onset:  Since 2017     Modifiers: Diet and Exercise, Physician Supervised Weight Loss Program and Commercial Weight Loss Programs-ie. Weight Watchers, Antoinette Darrell, Nutrisystem, etc.  Contributing factors: unsure   Associated symptoms: comorbid conditions and decreased self esteem       Food logging:l   Exercise: walking 2x/day 1-1.5 miles  Hydration:>64  oz of water        1 cup of coffee, black        Occasional  soda        Unsweetened iced tea  Sleep: 7-8 hours      Food recall:   B: skip  S: skips  L; 2-3 eggs, avocado  S:  "turkey roll ups. salad + vinegar, apple + PB  D: chicken tortilla soup  S: n/a     The following portions of the patient's history were reviewed and updated as appropriate: allergies, current medications, past family history, past medical history, past social history, past surgical history and problem list.    Review of Systems   HENT:  Negative for sore throat.    Respiratory:  Negative for cough and shortness of breath.    Cardiovascular:  Negative for chest pain and palpitations.   Gastrointestinal:  Negative for abdominal pain, constipation, diarrhea, nausea and vomiting.        Denies GERD   Skin:  Negative for rash.   Psychiatric/Behavioral:  Negative for suicidal ideas (denies HI).         Denies depression and anxiety       Objective:    /80   Pulse 85   Temp 97.6 °F (36.4 °C) (Tympanic)   Ht 5' 7\" (1.702 m)   Wt 101 kg (223 lb 6.4 oz)   BMI 34.99 kg/m²      Physical Exam  Vitals and nursing note reviewed.     Constitutional   General appearance: Abnormal.  well developed and obese.   Eyes No conjunctival injection   Pulmonary   Respiratory effort: No increased work of breathing or signs of respiratory distress.    Abdomen   Abdomen: Abnormal.  The abdomen was obese.  Musculoskeletal   Gait and station: Normal.    Skin  Dry. No visible rashes   Psychiatric   Orientation to person, place and time: Normal.    Affect: appropriate  Neurological   Normal gait     "

## 2024-04-12 DIAGNOSIS — E66.9 OBESITY, CLASS I, BMI 30-34.9: Primary | ICD-10-CM

## 2024-04-12 RX ORDER — PHENTERMINE HYDROCHLORIDE 15 MG/1
15 CAPSULE ORAL EVERY MORNING
Qty: 30 CAPSULE | Refills: 1 | Status: SHIPPED | OUTPATIENT
Start: 2024-04-12

## 2024-04-14 NOTE — TELEPHONE ENCOUNTER
Called patient and left message to return call to the office to schedule appointment with Dr Deborah De La Cruz for evaluation of botox  Name band;

## 2024-04-17 ENCOUNTER — TRANSCRIBE ORDERS (OUTPATIENT)
Dept: GASTROENTEROLOGY | Facility: CLINIC | Age: 44
End: 2024-04-17

## 2024-06-11 ENCOUNTER — OFFICE VISIT (OUTPATIENT)
Age: 44
End: 2024-06-11

## 2024-06-11 VITALS
DIASTOLIC BLOOD PRESSURE: 78 MMHG | WEIGHT: 211.2 LBS | HEART RATE: 72 BPM | HEIGHT: 67 IN | TEMPERATURE: 98.5 F | SYSTOLIC BLOOD PRESSURE: 118 MMHG | BODY MASS INDEX: 33.15 KG/M2

## 2024-06-11 DIAGNOSIS — E78.49 OTHER HYPERLIPIDEMIA: ICD-10-CM

## 2024-06-11 DIAGNOSIS — E66.9 OBESITY, CLASS I, BMI 30-34.9: Primary | ICD-10-CM

## 2024-06-11 NOTE — PROGRESS NOTES
Assessment/Plan:    1. Obesity, Class I, BMI 30-34.9        2. Other hyperlipidemia            Initial: 223 lbs (4/11/24)  Current: 211 lbs (6/11/24)  Change: -12 lbs  Goal: 185 lbs    - Weight not at goal  - Patient is interested in Conservative Program  - Labs reviewed: As below.    General Recommendations:  Nutrition:  Eat breakfast daily.  Do not skip meals.     Food log (ie.) www.myfitnesspal.com, sparkpeople.com, loseit.com, calorieking.com, etc.    Practice mindful eating.  Be sure to set aside time to eat, eat slowly, and savor your food.    Hydration:    At least 64oz of water daily.  No sugar sweetened beverages.  No juice (eat the fruit instead).    Exercise:  Studies have shown that the ideal exercise goal is somewhere between 150 to 300 minutes of moderate intensity exercise a week.  Start with exercising 10 minutes every other day and gradually increase physical activity with a goal of at least 150 minutes of moderate intensity exercise a week, divided over at least 3 days a week.  An example of this would be exercising 30 minutes a day, 5 days a week.  Resistance training can increase muscle mass and increase our resting metabolic rate.   FULL BODY resistance training is recommended 2-3 times a week.  Do not do this on consecutive days to allow for muscle recovery.    Aim for a bare minimum 5000 steps, even on days you do not exercise.    Monitoring:   Weigh yourself daily.  If this causes undue stress, then just weigh yourself once a week.  Weigh yourself the same time of the day with the same amount of clothing on.  Preferably this should be done after waking up, before you eat, and with no clothing or minimal clothing on.    Specific Goals:  Goal protein intake of 60-80 grams per day  No sugary beverages. At least 64oz of water daily.  Gradually increase physical activity to a goal of 5 days per week for 30 minutes of MODERATE intensity PLUS 2 days per week of FULL BODY resistance  training    Calorie goal:  9235-4210 rickey/day    Return visit:    Calorie tracking  Exercise - walking 2x daily. Add weight training.  AOM  Already on topamax for migraines  Started on phentermine 4/2024 (normal EKG)  Having excellent response with -12lbs in the past 2 months  Will continue on phentermine 15mg daily.  RTC 4 months           Subjective:   Excess weight       Patient ID: Freida Zepeda  is a 43 y.o. female with excess weight/obesity here to pursue weight managment.  Patient is pursuing Conservative Program with        Started IF  Metformin previously, caused constipation per patient report  Maintains on topamax d/t migraines    Started on phentermine last month to mimic Qsymia. Patient doing excellently on Phentermine 15mg daily. Denies any adverse side effects. Has lost 12 lbc in 2 months.     Obesity/Excess Weight:  Severity: Moderate  Onset:  Since 2017     Modifiers: Diet and Exercise, Physician Supervised Weight Loss Program and Commercial Weight Loss Programs-ie. Weight Watchers, [employer], Nutrisystem, etc.  Contributing factors: unsure   Associated symptoms: comorbid conditions and decreased self esteem       Food logging:  Exercise: walking 2x/day 1-1.5 miles  Hydration:>64  oz of water        1 cup of coffee, black        Occasional  soda        Unsweetened iced tea  Sleep: 7-8 hours      Food recall:   B: skip  S: skips  L; 2-3 eggs, avocado  S: turkey roll ups. salad + vinegar, apple + PB  D: chicken tortilla soup  S: n/a     The following portions of the patient's history were reviewed and updated as appropriate: allergies, current medications, past family history, past medical history, past social history, past surgical history and problem list.    Review of Systems   HENT:  Negative for sore throat.    Respiratory:  Negative for cough and shortness of breath.    Cardiovascular:  Negative for chest pain and palpitations.   Gastrointestinal:  Negative for abdominal pain, constipation,  "diarrhea, nausea and vomiting.        Denies GERD   Skin:  Negative for rash.   Psychiatric/Behavioral:  Negative for suicidal ideas (denies HI).         Denies depression and anxiety       Objective:    /78   Pulse 72   Temp 98.5 °F (36.9 °C) (Tympanic)   Ht 5' 7\" (1.702 m)   Wt 95.8 kg (211 lb 3.2 oz)   BMI 33.08 kg/m²      Physical Exam  Vitals and nursing note reviewed.     Constitutional   General appearance: Abnormal.  well developed and obese.   Eyes No conjunctival injection   Pulmonary   Respiratory effort: No increased work of breathing or signs of respiratory distress.    Abdomen   Abdomen: Abnormal.  The abdomen was obese.  Musculoskeletal   Gait and station: Normal.    Skin  Dry. No visible rashes   Psychiatric   Orientation to person, place and time: Normal.    Affect: appropriate  Neurological   Normal gait     "

## 2024-06-20 DIAGNOSIS — E66.9 OBESITY, CLASS I, BMI 30-34.9: ICD-10-CM

## 2024-06-20 RX ORDER — PHENTERMINE HYDROCHLORIDE 15 MG/1
15 CAPSULE ORAL EVERY MORNING
Qty: 30 CAPSULE | Refills: 1 | Status: SHIPPED | OUTPATIENT
Start: 2024-06-20

## 2024-06-20 NOTE — TELEPHONE ENCOUNTER
Refill must be reviewed and completed by the office or provider. The refill is unable to be approved or denied by the medication management team.

## 2024-09-04 DIAGNOSIS — E66.9 OBESITY, CLASS I, BMI 30-34.9: ICD-10-CM

## 2024-09-05 RX ORDER — PHENTERMINE HYDROCHLORIDE 15 MG/1
15 CAPSULE ORAL EVERY MORNING
Qty: 30 CAPSULE | Refills: 1 | Status: SHIPPED | OUTPATIENT
Start: 2024-09-05

## 2024-09-05 NOTE — TELEPHONE ENCOUNTER
PDMP Hx:     [id number] 07/19/2024 06/20/2024 Phentermine Hcl (Capsule) 30.0 30 15 MG NA cocone. Commercial Insurance    [id number] 06/20/2024 06/20/2024 Phentermine Hcl (Capsule) 30.0 30 15 MG NA cocone. Commercial Insurance

## 2024-09-17 ENCOUNTER — ANNUAL EXAM (OUTPATIENT)
Dept: OBGYN CLINIC | Facility: CLINIC | Age: 44
End: 2024-09-17
Payer: COMMERCIAL

## 2024-09-17 VITALS — SYSTOLIC BLOOD PRESSURE: 126 MMHG | BODY MASS INDEX: 32.77 KG/M2 | WEIGHT: 209.2 LBS | DIASTOLIC BLOOD PRESSURE: 80 MMHG

## 2024-09-17 DIAGNOSIS — N92.0 MENORRHAGIA WITH REGULAR CYCLE: Primary | ICD-10-CM

## 2024-09-17 DIAGNOSIS — Z01.419 WOMEN'S ANNUAL ROUTINE GYNECOLOGICAL EXAMINATION: ICD-10-CM

## 2024-09-17 PROCEDURE — G0476 HPV COMBO ASSAY CA SCREEN: HCPCS | Performed by: OBSTETRICS & GYNECOLOGY

## 2024-09-17 PROCEDURE — G0145 SCR C/V CYTO,THINLAYER,RESCR: HCPCS | Performed by: PATHOLOGY

## 2024-09-17 PROCEDURE — G0124 SCREEN C/V THIN LAYER BY MD: HCPCS | Performed by: PATHOLOGY

## 2024-09-17 PROCEDURE — 99396 PREV VISIT EST AGE 40-64: CPT | Performed by: OBSTETRICS & GYNECOLOGY

## 2024-09-17 RX ORDER — MEDROXYPROGESTERONE ACETATE 10 MG
TABLET ORAL
Qty: 10 TABLET | Refills: 3 | Status: SHIPPED | OUTPATIENT
Start: 2024-09-17 | End: 2024-09-26

## 2024-09-17 NOTE — PROGRESS NOTES
Ambulatory Visit  Name: Freida Zepeda      : 1980      MRN: [id number]  Encounter Provider: Riaz Villalobos MD  Encounter Date: 2024   Encounter department: OB GYN [facility]    Assessment & Plan  Women's annual routine gynecological examination       The patient was informed of a stable GYN examination.  A Pap smear was performed.  Because of her mother's history and bladder with ovarian cancer which she lost, the patient was inquiring about the possibility of a prophylactic hysterectomy and a BSO.  I have asked her to make an appointment with her GYN collagen team who took care of her mother.  She will keep me informed.  She continues her weight loss program which is going well.  She feels safe at home.  There is no problem with intimacy.  She sees a dentist on a regular basis.  She denies any prior depression or anxiety.  There are no new major family illnesses to report.  She should return to my office in 1 year unless new issues occur.    History of Present Illness     Freida Zepeda is a 44 y.o. female who presents for annual GYN examination.  She is a  2 para 2 with 2  sections.  Her current method of contraception includes a tubal ligation.  She is a history of abnormal uterine bleeding.  Heavy periods sometimes.  She had endometrial biopsy last year which was benign.  She states her periods are commonly every 2 to 3 weeks.  We talked about putting her on progesterone tablets.  She wants to wait at this time.  She did lose her mother when she had to ovarian cancer after a long kwok.  She is strongly considering having a hysterectomy removal of both her ovaries.  I suggest a consultation with GYN oncology.      Review of Systems   All other systems reviewed and are negative.          Objective     /80   Wt 94.9 kg (209 lb 3.2 oz)   LMP 2024 (Approximate)   BMI 32.77 kg/m²     Physical Exam  Vitals reviewed. Exam conducted with a chaperone present.    Constitutional:       Appearance: Normal appearance. She is normal weight.   HENT:      Head: Normocephalic and atraumatic.      Nose: Nose normal.      Mouth/Throat:      Mouth: Mucous membranes are moist.   Eyes:      Extraocular Movements: Extraocular movements intact.      Pupils: Pupils are equal, round, and reactive to light.   Cardiovascular:      Rate and Rhythm: Normal rate and regular rhythm.      Pulses: Normal pulses.      Heart sounds: Normal heart sounds.   Pulmonary:      Effort: Pulmonary effort is normal.      Breath sounds: Normal breath sounds.   Chest:   Breasts:     Breasts are symmetrical.      Right: Normal. No swelling, bleeding, inverted nipple, mass, nipple discharge or skin change.      Left: Normal. No swelling, bleeding, inverted nipple, mass, nipple discharge or skin change.   Abdominal:      General: Abdomen is flat. A surgical scar is present. Bowel sounds are normal. There is no distension.      Palpations: Abdomen is soft. There is no hepatomegaly, splenomegaly or mass.      Tenderness: There is no abdominal tenderness.      Hernia: No hernia is present. There is no hernia in the umbilical area, ventral area, left inguinal area or right inguinal area.          Comments:  section scar well-healed x 2   Genitourinary:     General: Normal vulva.      Pubic Area: No rash or pubic lice.       Labia:         Right: No rash, tenderness, lesion or injury.         Left: No rash, tenderness, lesion or injury.       Urethra: No prolapse, urethral pain, urethral swelling or urethral lesion.      Vagina: Normal. No signs of injury and foreign body. No vaginal discharge, erythema, tenderness, bleeding, lesions or prolapsed vaginal walls.      Cervix: Normal.      Uterus: Normal.       Adnexa: Right adnexa normal and left adnexa normal.        Right: No mass or tenderness.          Left: No mass or tenderness.        Rectum: Normal.      Comments: External genitalia normal limits the  vagina is clean the cervix is closed uterus is midposition normal size and Pap smear was performed.  There is no evidence of prolapse.  The urethra and bladder normal working relationship.  Musculoskeletal:         General: Normal range of motion.      Cervical back: Normal range of motion and neck supple.   Lymphadenopathy:      Upper Body:      Right upper body: No supraclavicular adenopathy.      Left upper body: No supraclavicular adenopathy.      Lower Body: No right inguinal adenopathy. No left inguinal adenopathy.   Skin:     General: Skin is warm and dry.   Neurological:      General: No focal deficit present.      Mental Status: She is alert and oriented to person, place, and time.   Psychiatric:         Mood and Affect: Mood normal.         Behavior: Behavior normal.         Thought Content: Thought content normal.

## 2024-09-17 NOTE — PATIENT INSTRUCTIONS
The patient was informed of a stable GYN examination and a Pap smear was performed.  She continues her weight loss program.  She is concerned about the possibility of ovarian cancer which her mother had.  She would like to have prophylactic consultation with GYN oncology.  She will keep me informed of the decision.  She return to my office in 1 year unless new issues occur.

## 2024-09-18 LAB
HPV HR 12 DNA CVX QL NAA+PROBE: NEGATIVE
HPV16 DNA CVX QL NAA+PROBE: NEGATIVE
HPV18 DNA CVX QL NAA+PROBE: NEGATIVE

## 2024-09-19 ENCOUNTER — DOCUMENTATION (OUTPATIENT)
Dept: HEMATOLOGY ONCOLOGY | Facility: CLINIC | Age: 44
End: 2024-09-19

## 2024-09-19 NOTE — PROGRESS NOTES
Chart rvw'd on 2024      Referred by:  Dr. Riaz Villalobos    Diagnosis:  Family history ovarian cancer (mother)    Imagin/10/2023 US pelvis complete w transvaginal-  Endometrial thickness at 15 mm.  Complex cyst on the right measuring 2.6 x 2.0 x 2.1 cm.     O-RADS 2 = Almost certainly benign  No further followup or workup needed.      Pathology:  N/A      Surgery:  N/A      Post surgical pathology:  N/A        Pt and obgyn discussed the pt's mother's hx of ovarian CA and pt wants to discuss and have hysterectomy.

## 2024-09-23 LAB
LAB AP GYN PRIMARY INTERPRETATION: ABNORMAL
Lab: ABNORMAL
PATH INTERP SPEC-IMP: ABNORMAL

## 2024-10-07 NOTE — PROGRESS NOTES
Assessment/Plan:    Problem List Items Addressed This Visit       Family history of ovarian cancer     45yo with migraines with aura, HLD, menorrhagia and family history of ovarian cancer presents for consultation    Records reviewed.     Pt does not have any genetic abnormality identified. Her mother did die at a young age from ovarian cancer (negative genetics) and as a result, pt is very concerned about her risk. She is done childbearing and had her tubes tied.     She and I have discussed risks for developing ovarian cancer to be about 15-40% during her lifetime, with BRCA1+ women tending to develop cancer at younger ages, in the late 30's and 40's and BRCA2+ women having risk to develop cancer into the 70's. Fortunately she does not have these mutations and therefore her risk would be estimated at the general population of 1 in 70-80. Given her concerns and young age of her mom with possibility of unknown mutation, we did discuss risk reduction options.     Her options for follow up include:   1) Screening with twice yearly , TVUS, and physical exam starting at age 30. Studies have not necessarily confirmed the effectiveness of this regimen. One study (UKFOCSS) showed a PPV of 25.5% with NPV 99.9%  2) Chemoprophylaxis with OCP's. Given her migraines with aura, this is NOT an option.   3) Prophylactic removal of the tubes and ovaries to decrease the risk of ovarian cancer which is not indicated at this time but should she require hysterectomy for menorrhagia, opportunistic oophorectomy could be an option. We discussed that early removal of ovaries would lead to premature menopause without the option of hormone replacement in her case. Given this, I would err on the side of waiting a few more years or if her other symptoms worsened to make the premature menopause and surgical risk worth the risk.     Pt would like to continue with pelvic U/S and ca125 understanding this may lead to more invasive procedures  and/or false negatives.     I will call her with any abnormal result     She will plan to return in 1 year to readdress her symptoms and possible surgery at that time or sooner if her menorrhagia worsens.              Relevant Orders    US pelvis complete non OB    US pelvis complete non OB    US pelvis complete non OB        Menorrhagia - Primary     We reviewed the possible etiologies of her AUB, including polyp, adenomyosis, leiomyoma, hyperplasia/malignancy, coagulopathy, ovulatory dysfunction, endometrial, iatrogenic.   We reviewed In women age 40 to menopause, the most common cause is anovulatory bleeding as a result of declining ovarian function.    EMB recommended in patients age > 45 with AUB.  EMB is also indicated in patients younger than 45 years with history of unopposed estrogen exposure (e.g. obesity or PCOS), failed medical management, or persistent AUB. This was done by GYN and negative.     She was started on provera given her inability to take OCP/estrogen. Will continue to monitor for improvement. She is interested in definitive management with hysterectomy if does not improve                   CHIEF COMPLAINT: family h/o ovarian cancer     Oncology Problem:   Cancer Staging   No matching staging information was found for the patient.      Previous therapy:  Oncology History    No history exists.       Most recent imaging:  Mammo screening bilateral w 3d & cad  Narrative: DIAGNOSIS: Encounter for screening mammogram for breast cancer; Encounter   for screening mammogram for malignant neoplasm of breast     TECHNIQUE:  Digital screening mammography was performed. Computer Aided Detection   (CAD) analyzed all applicable images.   COMPARISONS: Prior breast imaging dated: 04/01/2022    RELEVANT HISTORY:   Family Breast Cancer History: History of breast cancer in Maternal Aunt,   Maternal Aunt.  Family Medical History: Family medical history includes BRCA1 Negative in   mother, breast cancer in  2 relatives (maternal aunt, maternal aunt), colon   cancer in maternal grandmother, and ovarian cancer in mother.   Personal History: Hormone history includes birth control. No known   relevant surgical history. No known relevant medical history.    The patient is scheduled in a reminder system for screening mammography.    8-10% of cancers will be missed on mammography. Management of a palpable   abnormality must be based on clinical grounds.  Patients will be notified   of their results via letter from our facility. Accredited by [employer]   [employer] and FDA.    RISK ASSESSMENT:   5 Year Tyrer-Cuzick: 1.28 %  10 Year Tyrer-Cuzick: 3.13 %  Lifetime Tyrer-Cuzick: 20.31 %    TISSUE DENSITY:   There are scattered areas of fibroglandular density.       INDICATION: Freida Zepeda is a 43 y.o. female presenting for screening   mammography.    FINDINGS:   There are no suspicious masses, grouped microcalcifications or areas of   architectural distortion. The skin and nipple areolar complex are   unremarkable.  Impression: No mammographic evidence of malignancy.    This patient has been identified as being at elevated risk for development   of breast cancer based on the Tyrer-Cuzick model. She may benefit from   supplemental screening.    ASSESSMENT/BI-RADS CATEGORY:  Left: 1 - Negative  Right: 1 - Negative  Overall: 1 - Negative    RECOMMENDATION:       - Routine screening mammogram in 1 year for both breasts.    Workstation ID: [id number]        Patient ID: Freida Zepeda is a 44 y.o. female  43yo with migraines with aura, HLD, menorrhagia and family history of ovarian cancer presents for consultation. Pt was seen for annual visit c/o menorrhagia for which EMB was obtained and negative. Pelvic US was normal last year. Pt lost her mother  at a young age to ovarian cancer (under our care) and is worried about her risk. She has had her tubes tied for contraception - does not believe it was a salpingectomy.  She reports changes in her menstrual cycle over last few months with more frequent menses that are heavier. She was just started on provera but has not had a period yet ot assess any changes. She is on topomax for h/o seizure in the distant past. She does have h/o migraines with aura.         Review of Systems   Constitutional:  Negative for appetite change, chills, fatigue and fever.   Respiratory:  Negative for chest tightness and shortness of breath.    Cardiovascular:  Negative for chest pain.   Gastrointestinal:  Negative for abdominal distention, abdominal pain, constipation, diarrhea and nausea.   Genitourinary:  Negative for difficulty urinating, flank pain, frequency, urgency, vaginal bleeding, vaginal discharge and vaginal pain.   Musculoskeletal:  Negative for back pain, joint swelling and myalgias.   Skin:  Negative for rash.   Neurological:  Negative for dizziness, light-headedness, numbness and headaches.   Hematological:  Negative for adenopathy.   Psychiatric/Behavioral:  The patient is nervous/anxious.        Current Outpatient Medications   Medication Sig Dispense Refill    B Complex Vitamins (B COMPLEX PO) Take 1 tablet by mouth daily      MAGNESIUM PO Take 500 mg by mouth daily at bedtime      Multiple Vitamin tablet Take 1 tablet by mouth daily       topiramate (TOPAMAX) 100 mg tablet TAKE 2 AND 1/2 TABLET BY MOUTH TWO TIMES DAILY 450 tablet 3    clonazePAM (KlonoPIN) 0.5 MG disintegrating tablet Take 1 tablet (0.5 mg total) by mouth 2 (two) times a day as needed for seizures (facial twitching) (Patient not taking: Reported on 9/11/2023) 20 tablet 0    medroxyPROGESTERone (PROVERA) 10 mg tablet Please take 1 tablet orally daily for up to 10 days.  If your bleeding stops before then you may stop. 10 tablet 3    phentermine 30 MG capsule Take 1 capsule (30 mg total) by mouth every morning 30 capsule 1     No current facility-administered medications for this visit.       Allergies   Allergen  Reactions    Nuts - Food Allergy Throat Swelling and Edema     Tree nuts    Sulfa Antibiotics GI Intolerance and Seizures       Past Medical History:   Diagnosis Date    Adjustment disorder with anxiety     Class 1 obesity without serious comorbidity with body mass index (BMI) of 32.0 to 32.9 in adult 2021    Female infertility     Migraine with aura and without status migrainosus, not intractable 2018    Other hyperlipidemia     Seizures (HCC)     last seizures 5  years ago    Tonic-clonic epileptic seizures (HCC) 2017    Varicella     childhood       Past Surgical History:   Procedure Laterality Date    BUNIONECTOMY Right      SECTION      [place]  DELIVERY ONLY N/A 2016    Procedure:  SECTION () REPEAT;  Surgeon: Riaz Villalobos MD;  Location: [facility];  Service: Obstetrics    [place] LIG/TRNSXJ FLP TUBE ABDL/VAG APPR UNI/BI Bilateral 2016    Procedure: LIGATION/COAGULATION TUBAL;  Surgeon: Riaz Villalobos MD;  Location: [facility] ;  Service: Obstetrics       OB History          2    Para   2    Term   2            AB        Living   2         SAB        IAB        Ectopic        Multiple   0    Live Births   2                 Family History   Problem Relation Age of Onset    Hyperlipidemia Mother     Ovarian cancer Mother 57    BRCA1 Negative Mother     Diabetes type I Father     Learning disabilities Sister     Depression Sister     Hyperlipidemia Sister     No Known Problems Daughter     No Known Problems Daughter     Mental illness Maternal Grandmother     Bipolar disorder Maternal Grandmother     Colon cancer Maternal Grandmother 35        Colon cancer    Alcohol abuse Maternal Grandfather     Cancer Maternal Grandfather 52        Colon, stomach throat cancer - Unsure of primary cancer    Diabetes unspecified Paternal Grandmother     No Known Problems Paternal Grandfather     Asthma Brother     Hyperlipidemia Brother     Breast cancer Maternal Aunt 73  "       x 2    Thyroid disease Maternal Aunt     Breast cancer Maternal Aunt 47    Mental illness Maternal Uncle     Bipolar disorder Maternal Uncle     Alcohol abuse Paternal Aunt     Diabetes Paternal Aunt     Hypertension Neg Hx     Heart disease Neg Hx     Stroke Neg Hx        The following portions of the patient's history were reviewed and updated as appropriate: allergies, current medications, past family history, past medical history, past social history, past surgical history, and problem list.      Objective:    Blood pressure 140/82, pulse 84, temperature 97.8 °F (36.6 °C), temperature source Temporal, weight 93 kg (205 lb), last menstrual period 09/01/2024, SpO2 100%, not currently breastfeeding.  Body mass index is 32.11 kg/m².    Physical Exam  HENT:      Head: Normocephalic and atraumatic.      Nose: Nose normal.   Cardiovascular:      Rate and Rhythm: Normal rate and regular rhythm.   Pulmonary:      Effort: Pulmonary effort is normal.   Abdominal:      General: There is no distension.      Palpations: Abdomen is soft. There is no mass.   Genitourinary:     Comments: defer  Musculoskeletal:         General: No swelling. Normal range of motion.      Cervical back: Normal range of motion.   Skin:     General: Skin is warm and dry.   Neurological:      General: No focal deficit present.      Mental Status: She is alert.   Psychiatric:         Mood and Affect: Mood normal.           No results found for: \"\"  Lab Results   Component Value Date    WBC 9.71 08/19/2022    HGB 14.0 08/19/2022    HCT 43.8 08/19/2022    MCV 93 08/19/2022     08/19/2022     Lab Results   Component Value Date    K 3.4 (L) 08/19/2022     (H) 08/19/2022    CO2 21 08/19/2022    BUN 15 08/19/2022    CREATININE 1.00 08/19/2022    GLUF 81 09/09/2021    CALCIUM 8.9 08/19/2022    AST 9 08/19/2022    ALT 25 08/19/2022    ALKPHOS 57 08/19/2022    EGFR 70 08/19/2022        Trend:  No results found for: \"\"  "

## 2024-10-08 ENCOUNTER — CONSULT (OUTPATIENT)
Dept: GYNECOLOGIC ONCOLOGY | Facility: CLINIC | Age: 44
End: 2024-10-08
Payer: COMMERCIAL

## 2024-10-08 ENCOUNTER — OFFICE VISIT (OUTPATIENT)
Age: 44
End: 2024-10-08

## 2024-10-08 VITALS
TEMPERATURE: 97.8 F | OXYGEN SATURATION: 100 % | BODY MASS INDEX: 32.11 KG/M2 | WEIGHT: 205 LBS | HEART RATE: 84 BPM | SYSTOLIC BLOOD PRESSURE: 140 MMHG | DIASTOLIC BLOOD PRESSURE: 82 MMHG

## 2024-10-08 VITALS
SYSTOLIC BLOOD PRESSURE: 118 MMHG | HEIGHT: 67 IN | WEIGHT: 205.6 LBS | DIASTOLIC BLOOD PRESSURE: 80 MMHG | HEART RATE: 74 BPM | BODY MASS INDEX: 32.27 KG/M2 | TEMPERATURE: 98.5 F

## 2024-10-08 DIAGNOSIS — E78.49 OTHER HYPERLIPIDEMIA: ICD-10-CM

## 2024-10-08 DIAGNOSIS — N92.0 MENORRHAGIA WITH REGULAR CYCLE: Primary | ICD-10-CM

## 2024-10-08 DIAGNOSIS — E66.811 OBESITY, CLASS I, BMI 30-34.9: Primary | ICD-10-CM

## 2024-10-08 DIAGNOSIS — Z80.41 FAMILY HISTORY OF OVARIAN CANCER: ICD-10-CM

## 2024-10-08 PROCEDURE — 99205 OFFICE O/P NEW HI 60 MIN: CPT | Performed by: OBSTETRICS & GYNECOLOGY

## 2024-10-08 RX ORDER — PHENTERMINE HYDROCHLORIDE 30 MG/1
30 CAPSULE ORAL EVERY MORNING
Qty: 30 CAPSULE | Refills: 1 | Status: SHIPPED | OUTPATIENT
Start: 2024-10-08

## 2024-10-08 NOTE — LETTER
October 8, 2024     Riaz Villalobos MD  [address] [facility]  [address]  [place] [address]    Patient: Freida Zepeda   YOB: 1980   Date of Visit: 10/8/2024       Dear Dr. Villalobos:    Thank you for referring Freida Zepeda to me for evaluation. Below are my notes for this consultation.    If you have questions, please do not hesitate to call me. I look forward to following your patient along with you.         Sincerely,        Jaycee Retana MD        CC: No Recipients    Jaycee Retana MD  10/8/2024  1:20 PM  Sign when Signing Visit  Assessment/Plan:    Problem List Items Addressed This Visit       Family history of ovarian cancer     45yo with migraines with aura, HLD, menorrhagia and family history of ovarian cancer presents for consultation    Records reviewed.     Pt does not have any genetic abnormality identified. Her mother did die at a young age from ovarian cancer (negative genetics) and as a result, pt is very concerned about her risk. She is done childbearing and had her tubes tied.     She and I have discussed risks for developing ovarian cancer to be about 15-40% during her lifetime, with BRCA1+ women tending to develop cancer at younger ages, in the late 30's and 40's and BRCA2+ women having risk to develop cancer into the 70's. Fortunately she does not have these mutations and therefore her risk would be estimated at the general population of 1 in 70-80. Given her concerns and young age of her mom with possibility of unknown mutation, we did discuss risk reduction options.     Her options for follow up include:   1) Screening with twice yearly , TVUS, and physical exam starting at age 30. Studies have not necessarily confirmed the effectiveness of this regimen. One study (UKFOCSS) showed a PPV of 25.5% with NPV 99.9%  2) Chemoprophylaxis with OCP's. Given her migraines with aura, this is NOT an option.   3) Prophylactic removal of the tubes and ovaries to decrease the risk of  ovarian cancer which is not indicated at this time but should she require hysterectomy for menorrhagia, opportunistic oophorectomy could be an option. We discussed that early removal of ovaries would lead to premature menopause without the option of hormone replacement in her case. Given this, I would err on the side of waiting a few more years or if her other symptoms worsened to make the premature menopause and surgical risk worth the risk.     Pt would like to continue with pelvic U/S and ca125 understanding this may lead to more invasive procedures and/or false negatives.     I will call her with any abnormal result     She will plan to return in 1 year to readdress her symptoms and possible surgery at that time or sooner if her menorrhagia worsens.              Relevant Orders    US pelvis complete non OB    US pelvis complete non OB    US pelvis complete non OB        Menorrhagia - Primary     We reviewed the possible etiologies of her AUB, including polyp, adenomyosis, leiomyoma, hyperplasia/malignancy, coagulopathy, ovulatory dysfunction, endometrial, iatrogenic.   We reviewed In women age 40 to menopause, the most common cause is anovulatory bleeding as a result of declining ovarian function.    EMB recommended in patients age > 45 with AUB.  EMB is also indicated in patients younger than 45 years with history of unopposed estrogen exposure (e.g. obesity or PCOS), failed medical management, or persistent AUB. This was done by GYN and negative.     She was started on provera given her inability to take OCP/estrogen. Will continue to monitor for improvement. She is interested in definitive management with hysterectomy if does not improve                   CHIEF COMPLAINT: family h/o ovarian cancer     Oncology Problem:   Cancer Staging   No matching staging information was found for the patient.      Previous therapy:  Oncology History    No history exists.       Most recent imaging:  Mammo screening  bilateral w 3d & cad  Narrative: DIAGNOSIS: Encounter for screening mammogram for breast cancer; Encounter   for screening mammogram for malignant neoplasm of breast     TECHNIQUE:  Digital screening mammography was performed. Computer Aided Detection   (CAD) analyzed all applicable images.   COMPARISONS: Prior breast imaging dated: 04/01/2022    RELEVANT HISTORY:   Family Breast Cancer History: History of breast cancer in Maternal Aunt,   Maternal Aunt.  Family Medical History: Family medical history includes BRCA1 Negative in   mother, breast cancer in 2 relatives (maternal aunt, maternal aunt), colon   cancer in maternal grandmother, and ovarian cancer in mother.   Personal History: Hormone history includes birth control. No known   relevant surgical history. No known relevant medical history.    The patient is scheduled in a reminder system for screening mammography.    8-10% of cancers will be missed on mammography. Management of a palpable   abnormality must be based on clinical grounds.  Patients will be notified   of their results via letter from our facility. Accredited by American   College of Radiology and FDA.    RISK ASSESSMENT:   5 Year Tyrer-Cuzick: 1.28 %  10 Year Tyrer-Cuzick: 3.13 %  Lifetime Tyrer-Cuzick: 20.31 %    TISSUE DENSITY:   There are scattered areas of fibroglandular density.       INDICATION: Freida Zepeda is a 43 y.o. female presenting for screening   mammography.    FINDINGS:   There are no suspicious masses, grouped microcalcifications or areas of   architectural distortion. The skin and nipple areolar complex are   unremarkable.  Impression: No mammographic evidence of malignancy.    This patient has been identified as being at elevated risk for development   of breast cancer based on the Tyrer-Cuzick model. She may benefit from   supplemental screening.    ASSESSMENT/BI-RADS CATEGORY:  Left: 1 - Negative  Right: 1 - Negative  Overall: 1 - Negative    RECOMMENDATION:       -  Routine screening mammogram in 1 year for both breasts.    Workstation ID: [id number]        Patient ID: Freida Zepeda is a 44 y.o. female  43yo with migraines with aura, HLD, menorrhagia and family history of ovarian cancer presents for consultation. Pt was seen for annual visit c/o menorrhagia for which EMB was obtained and negative. Pelvic US was normal last year. Pt lost her mother  at a young age to ovarian cancer (under our care) and is worried about her risk. She has had her tubes tied for contraception - does not believe it was a salpingectomy. She reports changes in her menstrual cycle over last few months with more frequent menses that are heavier. She was just started on provera but has not had a period yet ot assess any changes. She is on topomax for h/o seizure in the distant past. She does have h/o migraines with aura.         Review of Systems   Constitutional:  Negative for appetite change, chills, fatigue and fever.   Respiratory:  Negative for chest tightness and shortness of breath.    Cardiovascular:  Negative for chest pain.   Gastrointestinal:  Negative for abdominal distention, abdominal pain, constipation, diarrhea and nausea.   Genitourinary:  Negative for difficulty urinating, flank pain, frequency, urgency, vaginal bleeding, vaginal discharge and vaginal pain.   Musculoskeletal:  Negative for back pain, joint swelling and myalgias.   Skin:  Negative for rash.   Neurological:  Negative for dizziness, light-headedness, numbness and headaches.   Hematological:  Negative for adenopathy.   Psychiatric/Behavioral:  The patient is nervous/anxious.        Current Outpatient Medications   Medication Sig Dispense Refill   • B Complex Vitamins (B COMPLEX PO) Take 1 tablet by mouth daily     • MAGNESIUM PO Take 500 mg by mouth daily at bedtime     • Multiple Vitamin tablet Take 1 tablet by mouth daily      • topiramate (TOPAMAX) 100 mg tablet TAKE 2 AND 1/2 TABLET BY MOUTH TWO TIMES DAILY 450  tablet 3   • clonazePAM (KlonoPIN) 0.5 MG disintegrating tablet Take 1 tablet (0.5 mg total) by mouth 2 (two) times a day as needed for seizures (facial twitching) (Patient not taking: Reported on 2023) 20 tablet 0   • medroxyPROGESTERone (PROVERA) 10 mg tablet Please take 1 tablet orally daily for up to 10 days.  If your bleeding stops before then you may stop. 10 tablet 3   • phentermine 30 MG capsule Take 1 capsule (30 mg total) by mouth every morning 30 capsule 1     No current facility-administered medications for this visit.       Allergies   Allergen Reactions   • Nuts - Food Allergy Throat Swelling and Edema     Tree nuts   • Sulfa Antibiotics GI Intolerance and Seizures       Past Medical History:   Diagnosis Date   • Adjustment disorder with anxiety    • Class 1 obesity without serious comorbidity with body mass index (BMI) of 32.0 to 32.9 in adult 2021   • Female infertility    • Migraine with aura and without status migrainosus, not intractable 2018   • Other hyperlipidemia    • Seizures (HCC)     last seizures 5  years ago   • Tonic-clonic epileptic seizures (HCC) 2017   • Varicella     childhood       Past Surgical History:   Procedure Laterality Date   • BUNIONECTOMY Right    •  SECTION     • MA  DELIVERY ONLY N/A 2016    Procedure:  SECTION () REPEAT;  Surgeon: Riaz Villalobos MD;  Location: [facility] ;  Service: Obstetrics   • MA LIG/TRNSXJ FLP TUBE ABDL/VAG APPR UNI/BI Bilateral 2016    Procedure: LIGATION/COAGULATION TUBAL;  Surgeon: Riaz Villalobos MD;  [facility];  Service: Obstetrics       OB History          2    Para   2    Term   2            AB        Living   2         SAB        IAB        Ectopic        Multiple   0    Live Births   2                 Family History   Problem Relation Age of Onset   • Hyperlipidemia Mother    • Ovarian cancer Mother 57   • BRCA1 Negative Mother    • Diabetes type I Father    •  Learning disabilities Sister    • Depression Sister    • Hyperlipidemia Sister    • No Known Problems Daughter    • No Known Problems Daughter    • Mental illness Maternal Grandmother    • Bipolar disorder Maternal Grandmother    • Colon cancer Maternal Grandmother 35        Colon cancer   • Alcohol abuse Maternal Grandfather    • Cancer Maternal Grandfather 52        Colon, stomach throat cancer - Unsure of primary cancer   • Diabetes unspecified Paternal Grandmother    • No Known Problems Paternal Grandfather    • Asthma Brother    • Hyperlipidemia Brother    • Breast cancer Maternal Aunt 73        x 2   • Thyroid disease Maternal Aunt    • Breast cancer Maternal Aunt 47   • Mental illness Maternal Uncle    • Bipolar disorder Maternal Uncle    • Alcohol abuse Paternal Aunt    • Diabetes Paternal Aunt    • Hypertension Neg Hx    • Heart disease Neg Hx    • Stroke Neg Hx        The following portions of the patient's history were reviewed and updated as appropriate: allergies, current medications, past family history, past medical history, past social history, past surgical history, and problem list.      Objective:    Blood pressure 140/82, pulse 84, temperature 97.8 °F (36.6 °C), temperature source Temporal, weight 93 kg (205 lb), last menstrual period 09/01/2024, SpO2 100%, not currently breastfeeding.  Body mass index is 32.11 kg/m².    Physical Exam  HENT:      Head: Normocephalic and atraumatic.      Nose: Nose normal.   Cardiovascular:      Rate and Rhythm: Normal rate and regular rhythm.   Pulmonary:      Effort: Pulmonary effort is normal.   Abdominal:      General: There is no distension.      Palpations: Abdomen is soft. There is no mass.   Genitourinary:     Comments: defer  Musculoskeletal:         General: No swelling. Normal range of motion.      Cervical back: Normal range of motion.   Skin:     General: Skin is warm and dry.   Neurological:      General: No focal deficit present.      Mental Status:  "She is alert.   Psychiatric:         Mood and Affect: Mood normal.           No results found for: \"\"  Lab Results   Component Value Date    WBC 9.71 08/19/2022    HGB 14.0 08/19/2022    HCT 43.8 08/19/2022    MCV 93 08/19/2022     08/19/2022     Lab Results   Component Value Date    K 3.4 (L) 08/19/2022     (H) 08/19/2022    CO2 21 08/19/2022    BUN 15 08/19/2022    CREATININE 1.00 08/19/2022    GLUF 81 09/09/2021    CALCIUM 8.9 08/19/2022    AST 9 08/19/2022    ALT 25 08/19/2022    ALKPHOS 57 08/19/2022    EGFR 70 08/19/2022        Trend:  No results found for: \"\"  "

## 2024-10-08 NOTE — PROGRESS NOTES
Assessment/Plan:    1. Obesity, Class I, BMI 30-34.9  phentermine 30 MG capsule      2. Other hyperlipidemia              Initial: 223 lbs (4/11/24)  Last visit: 211 lbs (6/11/24)  Current: 205 lbs (10/8/24)  Change:  -18 lbs  Goal: 185 lbs    - Weight not at goal  - Patient is interested in Conservative Program  - Labs reviewed: As below.    General Recommendations:  Nutrition:  Eat breakfast daily.  Do not skip meals.     Food log (ie.) www.Ataxion.com, sparkpeople.com, loseit.com, calorieking.com, etc.    Practice mindful eating.  Be sure to set aside time to eat, eat slowly, and savor your food.    Hydration:    At least 64oz of water daily.  No sugar sweetened beverages.  No juice (eat the fruit instead).    Exercise:  Studies have shown that the ideal exercise goal is somewhere between 150 to 300 minutes of moderate intensity exercise a week.  Start with exercising 10 minutes every other day and gradually increase physical activity with a goal of at least 150 minutes of moderate intensity exercise a week, divided over at least 3 days a week.  An example of this would be exercising 30 minutes a day, 5 days a week.  Resistance training can increase muscle mass and increase our resting metabolic rate.   FULL BODY resistance training is recommended 2-3 times a week.  Do not do this on consecutive days to allow for muscle recovery.    Aim for a bare minimum 5000 steps, even on days you do not exercise.    Monitoring:   Weigh yourself daily.  If this causes undue stress, then just weigh yourself once a week.  Weigh yourself the same time of the day with the same amount of clothing on.  Preferably this should be done after waking up, before you eat, and with no clothing or minimal clothing on.    Specific Goals:  Goal protein intake of 60-80 grams per day  No sugary beverages. At least 64oz of water daily.  Gradually increase physical activity to a goal of 5 days per week for 30 minutes of MODERATE intensity  PLUS 2 days per week of FULL BODY resistance training    Calorie goal:  1215-2101 rickey/day    Return visit:    Calorie tracking  Exercise - walking 2x daily. Doing resistance training  AOM  Already on topamax for migraines  Not a good candidate for wellbutrin d/t possible hx of seizures  Continue phentermine - will increase to 30mg  RTC: 3 month weight check, 6 month follow up          Subjective:   Excess weight       Patient ID: Freida Zepeda  is a 44 y.o. female with excess weight/obesity here to pursue weight managment.  Patient is pursuing Conservative Program with      Started IF  Metformin previously, caused constipation per patient report  Maintains on topamax d/t migraines    Started on phentermine last month to mimic Qsymia. Patient doing excellently on Phentermine 15mg daily. Denies any adverse side effects. Does feel like she has plateaued       Obesity/Excess Weight:  Severity: Moderate  Onset:  Since 2017     Modifiers: Diet and Exercise, Physician Supervised Weight Loss Program and Commercial Weight Loss Programs-ie. Weight Watchers, [employer], Nutrisystem, etc.  Contributing factors: unsure   Associated symptoms: comorbid conditions and decreased self esteem       Food logging:  Exercise: walking 2x/day 1-1.5 miles  Hydration:>64  oz of water        1 cup of coffee, black        Occasional  soda        Unsweetened iced tea  Sleep: 7-8 hours      Food recall:   B: skip  S: skips  L; 2-3 eggs, avocado  S: turkey roll ups. salad + vinegar, apple + PB  D: chicken tortilla soup  S: n/a     The following portions of the patient's history were reviewed and updated as appropriate: allergies, current medications, past family history, past medical history, past social history, past surgical history and problem list.    Review of Systems   HENT:  Negative for sore throat.    Respiratory:  Negative for cough and shortness of breath.    Cardiovascular:  Negative for chest pain and palpitations.  "  Gastrointestinal:  Negative for abdominal pain, constipation, diarrhea, nausea and vomiting.        Denies GERD   Skin:  Negative for rash.   Psychiatric/Behavioral:  Negative for suicidal ideas (denies HI).         Denies depression and anxiety       Objective:    /80   Pulse 74   Temp 98.5 °F (36.9 °C) (Tympanic)   Ht 5' 7\" (1.702 m)   Wt 93.3 kg (205 lb 9.6 oz)   LMP 09/01/2024 (Approximate)   BMI 32.20 kg/m²      Physical Exam  Vitals and nursing note reviewed.     Constitutional   General appearance: Abnormal.  well developed and obese.   Eyes No conjunctival injection   Pulmonary   Respiratory effort: No increased work of breathing or signs of respiratory distress.    Abdomen   Abdomen: Abnormal.  The abdomen was obese.  Musculoskeletal   Gait and station: Normal.    Skin  Dry. No visible rashes   Psychiatric   Orientation to person, place and time: Normal.    Affect: appropriate  Neurological   Normal gait     "

## 2024-10-08 NOTE — ASSESSMENT & PLAN NOTE
43yo with migraines with aura, HLD, menorrhagia and family history of ovarian cancer presents for consultation    Records reviewed.     Pt does not have any genetic abnormality identified. Her mother did die at a young age from ovarian cancer (negative genetics) and as a result, pt is very concerned about her risk. She is done childbearing and had her tubes tied.     She and I have discussed risks for developing ovarian cancer to be about 15-40% during her lifetime, with BRCA1+ women tending to develop cancer at younger ages, in the late 30's and 40's and BRCA2+ women having risk to develop cancer into the 70's. Fortunately she does not have these mutations and therefore her risk would be estimated at the general population of 1 in 70-80. Given her concerns and young age of her mom with possibility of unknown mutation, we did discuss risk reduction options.     Her options for follow up include:   1) Screening with twice yearly , TVUS, and physical exam starting at age 30. Studies have not necessarily confirmed the effectiveness of this regimen. One study (UKFOCSS) showed a PPV of 25.5% with NPV 99.9%  2) Chemoprophylaxis with OCP's. Given her migraines with aura, this is NOT an option.   3) Prophylactic removal of the tubes and ovaries to decrease the risk of ovarian cancer which is not indicated at this time but should she require hysterectomy for menorrhagia, opportunistic oophorectomy could be an option. We discussed that early removal of ovaries would lead to premature menopause without the option of hormone replacement in her case. Given this, I would err on the side of waiting a few more years or if her other symptoms worsened to make the premature menopause and surgical risk worth the risk.     Pt would like to continue with pelvic U/S and ca125 understanding this may lead to more invasive procedures and/or false negatives.     I will call her with any abnormal result     She will plan to return in 1  year to readdress her symptoms and possible surgery at that time or sooner if her menorrhagia worsens.

## 2024-10-08 NOTE — ASSESSMENT & PLAN NOTE
We reviewed the possible etiologies of her AUB, including polyp, adenomyosis, leiomyoma, hyperplasia/malignancy, coagulopathy, ovulatory dysfunction, endometrial, iatrogenic.   We reviewed In women age 40 to menopause, the most common cause is anovulatory bleeding as a result of declining ovarian function.    EMB recommended in patients age > 45 with AUB.  EMB is also indicated in patients younger than 45 years with history of unopposed estrogen exposure (e.g. obesity or PCOS), failed medical management, or persistent AUB. This was done by GYN and negative.     She was started on provera given her inability to take OCP/estrogen. Will continue to monitor for improvement. She is interested in definitive management with hysterectomy if does not improve

## 2024-10-09 ENCOUNTER — HOSPITAL ENCOUNTER (OUTPATIENT)
Dept: RADIOLOGY | Age: 44
Discharge: HOME | End: 2024-10-09
Payer: COMMERCIAL

## 2024-10-09 DIAGNOSIS — Z12.31 VISIT FOR SCREENING MAMMOGRAM: ICD-10-CM

## 2024-10-09 PROCEDURE — 77067 SCR MAMMO BI INCL CAD: CPT

## 2024-10-09 PROCEDURE — 77063 BREAST TOMOSYNTHESIS BI: CPT

## 2024-11-21 DIAGNOSIS — G40.409 TONIC-CLONIC EPILEPTIC SEIZURES (HCC): ICD-10-CM

## 2024-11-21 DIAGNOSIS — G43.109 MIGRAINE WITH AURA AND WITHOUT STATUS MIGRAINOSUS, NOT INTRACTABLE: ICD-10-CM

## 2025-01-08 ENCOUNTER — TELEPHONE (OUTPATIENT)
Age: 45
End: 2025-01-08

## 2025-01-08 DIAGNOSIS — E66.811 OBESITY, CLASS I, BMI 30-34.9: ICD-10-CM

## 2025-01-08 RX ORDER — PHENTERMINE HYDROCHLORIDE 30 MG/1
30 CAPSULE ORAL EVERY MORNING
Qty: 30 CAPSULE | Refills: 1 | Status: SHIPPED | OUTPATIENT
Start: 2025-01-08

## 2025-01-08 NOTE — TELEPHONE ENCOUNTER
Patient has only two refills left of her medication and is wondering if she can get it renewed. Please advise.

## 2025-01-23 NOTE — TELEPHONE ENCOUNTER
Medication: topamax    Dose/Frequency: 100 mg 2 and 1/2 tab bid     Quantity: 450    Pharmacy: [facility]     Office:   [] PCP/Provider -   [x] Speciality/Provider -     Does the patient have enough for 3 days?   [] Yes   [] No - Send as HP to POD

## 2025-01-23 NOTE — TELEPHONE ENCOUNTER
Scheduled patient with Dr Mendez 7/9 in [place] office added to wait list.    Please refill RX

## 2025-01-24 ENCOUNTER — CLINICAL SUPPORT (OUTPATIENT)
Age: 45
End: 2025-01-24

## 2025-01-24 VITALS
HEART RATE: 71 BPM | HEIGHT: 67 IN | DIASTOLIC BLOOD PRESSURE: 80 MMHG | TEMPERATURE: 99.1 F | BODY MASS INDEX: 32.74 KG/M2 | WEIGHT: 208.6 LBS | SYSTOLIC BLOOD PRESSURE: 118 MMHG

## 2025-01-24 DIAGNOSIS — E66.811 OBESITY, CLASS I, BMI 30-34.9: Primary | ICD-10-CM

## 2025-01-24 DIAGNOSIS — R63.5 ABNORMAL WEIGHT GAIN: Primary | ICD-10-CM

## 2025-01-24 PROCEDURE — RECHECK

## 2025-01-24 RX ORDER — PHENTERMINE HYDROCHLORIDE 37.5 MG/1
37.5 TABLET ORAL DAILY
Qty: 30 TABLET | Refills: 1 | Status: SHIPPED | OUTPATIENT
Start: 2025-01-24

## 2025-01-24 RX ORDER — TOPIRAMATE 100 MG/1
TABLET, FILM COATED ORAL
Qty: 450 TABLET | Refills: 2 | Status: SHIPPED | OUTPATIENT
Start: 2025-01-24

## 2025-01-24 NOTE — PROGRESS NOTES
Patient last visit weight: 217.6 lb   Patient current visit weight: 208.6 ln     If you are taking phentermine or other oral weight loss medications, are you experiencing any of the following symptoms:  Headache: NO  Blurred Vision: NO  Chest Pain: NO  Palpitations:NO  Insomnia: NO   SPECIFY ORAL MEDICATION AND DOSAGE: Phentermine 30 mg  Topamax 100 mg - pt has been not been losing any weight , she been at a stall , pt would like to increase her phentermine , topamax she was taking one and half tab in the AM and 2 1/2 tabs in the PM . She will increase her AM Dosage to 2 1/2 AM and PM. As per directed     If you are taking an injectable medication,  are you experiencing any of the following symptoms:  Bloating:   Nausea:  Vomiting:   Constipation:   Diarrhea:  SPECIFY INJECTABLE MEDICATION AND CURRENT DOSAGE:  Vitals:    Is BP less than 100/60?  Is BP greater than 140/90?  Is HR greater than 100?  **If yes to any of the above, have patient relax and repeat in 5-10 minutes**    Repeat values:    Is BP less than 100/60?  Is BP greater than 140/90?  Is HR greater than 100?  **If values remain outside of ranges above, please consult provider for next steps**      Date of last injection:    How many injections do you have left:

## 2025-03-24 ENCOUNTER — TELEPHONE (OUTPATIENT)
Age: 45
End: 2025-03-24

## 2025-03-24 ENCOUNTER — OFFICE VISIT (OUTPATIENT)
Age: 45
End: 2025-03-24
Payer: COMMERCIAL

## 2025-03-24 VITALS
DIASTOLIC BLOOD PRESSURE: 80 MMHG | HEIGHT: 67 IN | TEMPERATURE: 98.6 F | SYSTOLIC BLOOD PRESSURE: 110 MMHG | BODY MASS INDEX: 33.62 KG/M2 | WEIGHT: 214.2 LBS | HEART RATE: 76 BPM

## 2025-03-24 DIAGNOSIS — E66.811 OBESITY, CLASS I, BMI 30-34.9: Primary | ICD-10-CM

## 2025-03-24 PROCEDURE — 99213 OFFICE O/P EST LOW 20 MIN: CPT | Performed by: PHYSICIAN ASSISTANT

## 2025-03-24 RX ORDER — TIRZEPATIDE 2.5 MG/.5ML
2.5 INJECTION, SOLUTION SUBCUTANEOUS WEEKLY
Qty: 2 ML | Refills: 0 | Status: SHIPPED | OUTPATIENT
Start: 2025-03-24 | End: 2025-04-21

## 2025-03-24 NOTE — TELEPHONE ENCOUNTER
PA for Zepbound SUBMITTED to [employer]    via    [x]CMM-KEY: [id number]  []Surescripts-Case ID #   []Availity-Auth ID # NDC #   []Faxed to plan   []Other website   []Phone call Case ID #     []PA sent as URGENT    All office notes, labs and other pertaining documents and studies sent. Clinical questions answered. Awaiting determination from insurance company.     Turnaround time for your insurance to make a decision on your Prior Authorization can take 7-21 business days.

## 2025-03-24 NOTE — PROGRESS NOTES
Assessment/Plan:    1. Obesity, Class I, BMI 30-34.9  Basic metabolic panel    TSH, 3rd generation with Free T4 reflex    Insulin, fasting    Hemoglobin A1C    Vitamin D 25 hydroxy    Vitamin B12    tirzepatide (Zepbound) 2.5 mg/0.5 mL auto-injector              Initial: 223 lbs (4/11/24)  Last visit: 205 lbs (10/8/24)  Current:  214 lbs BMI 33.55 (3/24/25)  Goal: 185 lbs    - Weight not at goal  - Patient is interested in Conservative Program  - Labs reviewed: As below.    General Recommendations:  Nutrition:  Eat breakfast daily.  Do not skip meals.     Food log (ie.) www.[employer].com, sparkpeople.com, loseit.com, calorieking.com, etc.    Practice mindful eating.  Be sure to set aside time to eat, eat slowly, and savor your food.    Hydration:    At least 64oz of water daily.  No sugar sweetened beverages.  No juice (eat the fruit instead).    Exercise:  Studies have shown that the ideal exercise goal is somewhere between 150 to 300 minutes of moderate intensity exercise a week.  Start with exercising 10 minutes every other day and gradually increase physical activity with a goal of at least 150 minutes of moderate intensity exercise a week, divided over at least 3 days a week.  An example of this would be exercising 30 minutes a day, 5 days a week.  Resistance training can increase muscle mass and increase our resting metabolic rate.   FULL BODY resistance training is recommended 2-3 times a week.  Do not do this on consecutive days to allow for muscle recovery.    Aim for a bare minimum 5000 steps, even on days you do not exercise.    Monitoring:   Weigh yourself daily.  If this causes undue stress, then just weigh yourself once a week.  Weigh yourself the same time of the day with the same amount of clothing on.  Preferably this should be done after waking up, before you eat, and with no clothing or minimal clothing on.    Specific Goals:  Goal protein intake of 60-80 grams per day  No sugary beverages. At  least 64oz of water daily.  Gradually increase physical activity to a goal of 5 days per week for 30 minutes of MODERATE intensity PLUS 2 days per week of FULL BODY resistance training    Calorie goal:  1613-9271 rickey/day    Return visit:    Calorie tracking  Exercise - walking 2x daily. Doing resistance training  AOM  Already on topamax for migraines  Not a good candidate for wellbutrin d/t hx of seizures  Trial of zepbound. Use and side effect profile reviewed  - Patient denies personal history of pancreatitis. Patient also denies personal and family history of thyroid cancer and multiple endocrine neoplasia type 2 (MEN 2 tumor).   If insurance does not cover GLP1, we discussed out of pocket pricing versus restarting phentermine 15mg  RTC: 4 months         Subjective:   Excess weight       Patient ID: Freida Zepeda  is a 44 y.o. female with excess weight/obesity here to pursue weight managment.  Patient is pursuing Conservative Program with      Started IF  Metformin previously, caused constipation per patient report  Maintains on topamax d/t migraines    Started on phentermine to mimic Qsymia.Was doing very well on 15mg and then 30mg. Did feel like her vision was blurry at 37.5mg. She was seen by optometrist and reportedly had normal eye pressures at this time. She discontinued 2 months ago.  Noticing increased appetite and cravings. Has gained some weight back.      Obesity/Excess Weight:  Severity: Moderate  Onset:  Since 2017     Modifiers: Diet and Exercise, Physician Supervised Weight Loss Program and Commercial Weight Loss Programs-ie. Weight Watchers, Antoinette Darrell, Nutrisystem, etc.  Contributing factors: unsure   Associated symptoms: comorbid conditions and decreased self esteem       Food logging:  Exercise: walking 2x/day 1-1.5 miles  Hydration:>64  oz of water        1 cup of coffee, black        Occasional  soda        Unsweetened iced tea  Sleep: 7-8 hours      Food recall:   B: skip  S: skips  L;  "2-3 eggs, avocado  S: turkey roll ups. salad + vinegar, apple + PB  D: chicken tortilla soup  S: n/a     The following portions of the patient's history were reviewed and updated as appropriate: allergies, current medications, past family history, past medical history, past social history, past surgical history and problem list.    Review of Systems   HENT:  Negative for sore throat.    Respiratory:  Negative for cough and shortness of breath.    Cardiovascular:  Negative for chest pain and palpitations.   Gastrointestinal:  Negative for abdominal pain, constipation, diarrhea, nausea and vomiting.        Denies GERD   Skin:  Negative for rash.   Psychiatric/Behavioral:  Negative for suicidal ideas (denies HI).         Denies depression and anxiety       Objective:    /80 (Patient Position: Sitting, Cuff Size: Standard)   Pulse 76   Temp 98.6 °F (37 °C) (Tympanic)   Ht 5' 7\" (1.702 m)   Wt 97.2 kg (214 lb 3.2 oz)   BMI 33.55 kg/m²      Physical Exam  Vitals and nursing note reviewed.     Constitutional   General appearance: Abnormal.  well developed and obese.   Eyes No conjunctival injection   Pulmonary   Respiratory effort: No increased work of breathing or signs of respiratory distress.    Abdomen   Abdomen: Abnormal.  The abdomen was obese.  Musculoskeletal   Gait and station: Normal.    Skin  Dry. No visible rashes   Psychiatric   Orientation to person, place and time: Normal.    Affect: appropriate  Neurological   Normal gait     "

## 2025-03-25 NOTE — TELEPHONE ENCOUNTER
PA for Zepbound EXCLUDED from plan       Reason: Medication not covered under plan        Message sent to office clinical pool Yes

## 2025-03-28 ENCOUNTER — APPOINTMENT (OUTPATIENT)
Dept: LAB | Age: 45
End: 2025-03-28
Payer: COMMERCIAL

## 2025-03-28 DIAGNOSIS — E66.811 OBESITY, CLASS I, BMI 30-34.9: ICD-10-CM

## 2025-03-28 DIAGNOSIS — Z80.41 FAMILY HISTORY OF OVARIAN CANCER: ICD-10-CM

## 2025-03-28 LAB
25(OH)D3 SERPL-MCNC: 23.4 NG/ML (ref 30–100)
ANION GAP SERPL CALCULATED.3IONS-SCNC: 7 MMOL/L (ref 4–13)
BUN SERPL-MCNC: 15 MG/DL (ref 5–25)
CALCIUM SERPL-MCNC: 9.6 MG/DL (ref 8.4–10.2)
CANCER AG125 SERPL-ACNC: 16 U/ML (ref 0–35)
CHLORIDE SERPL-SCNC: 109 MMOL/L (ref 96–108)
CO2 SERPL-SCNC: 23 MMOL/L (ref 21–32)
CREAT SERPL-MCNC: 0.9 MG/DL (ref 0.6–1.3)
EST. AVERAGE GLUCOSE BLD GHB EST-MCNC: 94 MG/DL
GFR SERPL CREATININE-BSD FRML MDRD: 78 ML/MIN/1.73SQ M
GLUCOSE P FAST SERPL-MCNC: 82 MG/DL (ref 65–99)
HBA1C MFR BLD: 4.9 %
INSULIN SERPL-ACNC: 4.44 UIU/ML (ref 1.9–23)
POTASSIUM SERPL-SCNC: 4.1 MMOL/L (ref 3.5–5.3)
SODIUM SERPL-SCNC: 139 MMOL/L (ref 135–147)
TSH SERPL DL<=0.05 MIU/L-ACNC: 1.02 UIU/ML (ref 0.45–4.5)
VIT B12 SERPL-MCNC: 485 PG/ML (ref 180–914)

## 2025-03-28 PROCEDURE — 84443 ASSAY THYROID STIM HORMONE: CPT

## 2025-03-28 PROCEDURE — 36415 COLL VENOUS BLD VENIPUNCTURE: CPT

## 2025-03-28 PROCEDURE — 80048 BASIC METABOLIC PNL TOTAL CA: CPT

## 2025-03-28 PROCEDURE — 86304 IMMUNOASSAY TUMOR CA 125: CPT

## 2025-03-28 PROCEDURE — 83036 HEMOGLOBIN GLYCOSYLATED A1C: CPT

## 2025-03-28 PROCEDURE — 82607 VITAMIN B-12: CPT

## 2025-03-28 PROCEDURE — 82306 VITAMIN D 25 HYDROXY: CPT

## 2025-03-28 PROCEDURE — 83525 ASSAY OF INSULIN: CPT

## 2025-04-02 DIAGNOSIS — E66.811 OBESITY, CLASS I, BMI 30-34.9: Primary | ICD-10-CM

## 2025-04-02 RX ORDER — PHENTERMINE HYDROCHLORIDE 15 MG/1
15 CAPSULE ORAL EVERY MORNING
Qty: 30 CAPSULE | Refills: 3 | Status: SHIPPED | OUTPATIENT
Start: 2025-04-02

## 2025-04-29 ENCOUNTER — HOSPITAL ENCOUNTER (OUTPATIENT)
Dept: RADIOLOGY | Age: 45
Discharge: HOME | End: 2025-04-29
Attending: OBSTETRICS & GYNECOLOGY
Payer: COMMERCIAL

## 2025-04-29 DIAGNOSIS — Z80.41 FAMILY HISTORY OF OVARIAN CANCER: ICD-10-CM

## 2025-04-29 PROCEDURE — 76856 US EXAM PELVIC COMPLETE: CPT

## 2025-04-29 PROCEDURE — 76830 TRANSVAGINAL US NON-OB: CPT

## 2025-05-04 ENCOUNTER — RESULTS FOLLOW-UP (OUTPATIENT)
Dept: GYNECOLOGIC ONCOLOGY | Facility: CLINIC | Age: 45
End: 2025-05-04

## 2025-07-03 ENCOUNTER — TELEPHONE (OUTPATIENT)
Dept: NEUROLOGY | Facility: CLINIC | Age: 45
End: 2025-07-03

## 2025-07-03 NOTE — TELEPHONE ENCOUNTER
Attempted to call pt to confirm 7/9 appt with Dr. Mendez, person who answered said I had the wrong number.

## 2025-07-09 ENCOUNTER — OFFICE VISIT (OUTPATIENT)
Dept: NEUROLOGY | Facility: CLINIC | Age: 45
End: 2025-07-09
Payer: COMMERCIAL

## 2025-07-09 VITALS
WEIGHT: 214.1 LBS | TEMPERATURE: 98.3 F | HEIGHT: 67 IN | SYSTOLIC BLOOD PRESSURE: 126 MMHG | HEART RATE: 93 BPM | BODY MASS INDEX: 33.6 KG/M2 | DIASTOLIC BLOOD PRESSURE: 94 MMHG | OXYGEN SATURATION: 99 %

## 2025-07-09 DIAGNOSIS — G40.009 PARTIAL IDIOPATHIC EPILEPSY WITH SEIZURES OF LOCALIZED ONSET, NOT INTRACTABLE, WITHOUT STATUS EPILEPTICUS (HCC): Primary | ICD-10-CM

## 2025-07-09 DIAGNOSIS — G43.109 MIGRAINE WITH AURA AND WITHOUT STATUS MIGRAINOSUS, NOT INTRACTABLE: ICD-10-CM

## 2025-07-09 DIAGNOSIS — G51.32 HEMIFACIAL SPASM OF LEFT SIDE OF FACE: ICD-10-CM

## 2025-07-09 PROCEDURE — 99214 OFFICE O/P EST MOD 30 MIN: CPT | Performed by: PSYCHIATRY & NEUROLOGY

## 2025-07-09 RX ORDER — ALBUTEROL SULFATE 90 UG/1
2 INHALANT RESPIRATORY (INHALATION)
COMMUNITY
Start: 2025-06-06

## 2025-07-09 RX ORDER — TOPIRAMATE 100 MG/1
200 TABLET, FILM COATED ORAL EVERY 12 HOURS SCHEDULED
Qty: 360 TABLET | Refills: 3 | Status: SHIPPED | OUTPATIENT
Start: 2025-07-09

## 2025-07-09 NOTE — PROGRESS NOTES
Review of Systems   Constitutional:  Negative for appetite change, fatigue and fever.   HENT: Negative.  Negative for hearing loss, tinnitus, trouble swallowing and voice change.    Eyes: Negative.  Negative for photophobia, pain and visual disturbance.   Respiratory: Negative.  Negative for shortness of breath.    Cardiovascular: Negative.  Negative for palpitations.   Gastrointestinal: Negative.  Negative for nausea and vomiting.   Endocrine: Negative.  Negative for cold intolerance.   Genitourinary: Negative.  Negative for dysuria, frequency and urgency.   Musculoskeletal:  Negative for back pain, gait problem, myalgias, neck pain and neck stiffness.   Skin: Negative.  Negative for rash.   Allergic/Immunologic: Negative.    Neurological:  Negative for dizziness, tremors, seizures, syncope, facial asymmetry, speech difficulty, weakness, light-headedness, numbness and headaches.   Hematological: Negative.  Does not bruise/bleed easily.   Psychiatric/Behavioral: Negative.  Negative for confusion, hallucinations and sleep disturbance.    All other systems reviewed and are negative.

## 2025-07-09 NOTE — PROGRESS NOTES
[facility]  Follow Up Visit    Impression/Plan        Assessment & Plan  Partial idiopathic epilepsy with seizures of localized onset, not intractable, without status epilepticus (HCC)  Freida Zepeda is a 45 y.o. 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 Her neurological exam is normal. There are no clear seizure risk factors. EEG data (temporal slowing and possible temporal discharges, not personally reviewed) and semiology suggest focal epilepsy.  Topiramate was increased in the past, not due to seizures, but to see if it would help with her left facial spasms.  It has not helped at all.  She feels there are some intermittent word finding problems.  I will have her decrease topiramate from 250 mg twice daily to 200 mg twice daily.  If there is only partial benefit we could decrease further in the future to 150 mg twice daily.         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  Seen in the office today involving the muscles around the eye as well as the left cheek.  There is also mild facial weakness on the left.  She finds the symptoms bothersome and embarrassing.  Botox had been planned through our department, but she reports that she was not able to get this scheduled because there was very limited availability.  Previously seen at [facility] by neurosurgery and surgical options were considered.  She requires an updated MRI to reevaluate the microvascular compression that was evident in 2022.    Orders:    MRI brain with and without contrast; Future         Patient Instructions   Decrease topiramate to 200 mg morning and 250 mg evening for one week  and then take 200 mg twice daily.   Could consider further decrease in the future if needed.   Return in one year.         Subjective    Freida Zepeda is returning to the [facility] for follow up.     Interval Events:   Seizures since last visit: None    Last seen by SUSAN Foster on 8/19/2022. Seen by Dr. Willis regarding hemifacial spasm in 1/2023.     No events concerning for seizure.  She does feel there are some intermittent word finding problems.  She stays very busy with work and her 10 and 12-year-old.    Left facial spasms continue.  She said she tried to schedule Botox, but could not get it scheduled through our office.  She previously had Botox through dermatology and this was somewhat helpful, but she is not sure they were able to accurately target the appropriate muscles.  She does not feel that past topiramate increases helped at all with the facial spasms.    Current AEDs:  Topiramate 250 mg twice  Other medications as per Epic.     Event/Seizure semiology:  1. GTC seizure. None for several years  2. Aura involving black and white vision, tunnel vision, racing heart sensation may occur. Precedes #1 by 30 seconds or may rarely occur without progression. None for several years     Special Features  Status epilepticus: no  Self Injury Seizures: yes - falls  Precipitating Factors: none     Epilepsy Risk Factors:  Told by Dr. Smith that she has a lesion in her brain, details uncertain. Normal birth and development. No history of seizures as a child. No family history of seizures. No head trauma resulting in loss of consciousness. No history of stroke or CNS infection.      Prior AEDs:  Carbamazepine  Lamotrigine ineffective, forgetful  Levetiracetam ineffective  Pregabalin - couldnt feel feet  Vimpat  Thinks there were others, but does not recall     Prior Evaluation:  Was getting annual MRIs with Dr. Smith, apparently to follow a lesion in the left temporal lobe.  "(records requested from [facility], no care everyhwere records in Epic)     Greater than 1 hour EEG: Mildly abnormal EEG in wakefulness and sleep due to the presence of two episodes in drowsiness of focal left temporal slowing (T3 maximal).   48 hour AEEG 11/9/2009 (Dr. Sinclair): The patient's noted numbing in face and headache were not associated with epileptiform activity. In comparison with a prior greater than one hour EEG read by myself on 9/30/2009 that study was mildly suggestive of a left temporal abnormality which was not clearly demonstrated during this study.   REEG 3/30/2012 (Dr. Smith): Normal awake and asleep.   REEG 1/2/2013 (Dr. Smith): bitemporal independent sharp waves.  Prolonged 1 hour EEG 7/18/22: occasional delta and theta activity in the left temporal region.     MRI brain seizure scheduled  9/22/22     History Reviewed:   The following were reviewed and updated as appropriate: allergies, current medications, past medical history, past social history and problem list     Psychiatric History:  None     Social History:   Driving: Yes  Lives Alone: No  Occupation: Realtor       Objective    /94 (BP Location: Left arm, Patient Position: Sitting, Cuff Size: Standard)   Pulse 93   Temp 98.3 °F (36.8 °C) (Temporal)   Ht 5' 7\" (1.702 m)   Wt 97.1 kg (214 lb 1.6 oz)   SpO2 99%   BMI 33.53 kg/m²      General Exam  No acute distress.  Tearful at times when discussing her facial spasm.    Neurologic Exam  Mental Status:  Alert and oriented x 3.  Language: normal fluency and comprehension.  Cranial Nerves:  VFFTC. EOMI, no nystagums.  Mild left facial weakness, flattening of the left nasolabial fold.  Intermittent mild spasm of the muscles around the left eye.  Gait: Normal casual gait.       "

## 2025-07-09 NOTE — ASSESSMENT & PLAN NOTE
Orders:    topiramate (TOPAMAX) 100 mg tablet; Take 2 tablets (200 mg total) by mouth every 12 (twelve) hours

## 2025-07-09 NOTE — ASSESSMENT & PLAN NOTE
Freida Zepeda is a 45 y.o. 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 Her neurological exam is normal. There are no clear seizure risk factors. EEG data (temporal slowing and possible temporal discharges, not personally reviewed) and semiology suggest focal epilepsy.  Topiramate was increased in the past, not due to seizures, but to see if it would help with her left facial spasms.  It has not helped at all.  She feels there are some intermittent word finding problems.  I will have her decrease topiramate from 250 mg twice daily to 200 mg twice daily.  If there is only partial benefit we could decrease further in the future to 150 mg twice daily.

## 2025-07-09 NOTE — ASSESSMENT & PLAN NOTE
Left hemifacial spasm continues.  Seen in the office today involving the muscles around the eye as well as the left cheek.  There is also mild facial weakness on the left.  She finds the symptoms bothersome and embarrassing.  Botox had been planned through our department, but she reports that she was not able to get this scheduled because there was very limited availability.  Previously seen at [facility] by neurosurgery and surgical options were considered.  She requires an updated MRI to reevaluate the microvascular compression that was evident in 2022.    Orders:    MRI brain with and without contrast; Future

## 2025-07-09 NOTE — PATIENT INSTRUCTIONS
Decrease topiramate to 200 mg morning and 250 mg evening for one week and then take 200 mg twice daily.   Could consider further decrease in the future if needed.   Return in one year.

## 2025-07-22 ENCOUNTER — OFFICE VISIT (OUTPATIENT)
Age: 45
End: 2025-07-22
Payer: COMMERCIAL

## 2025-07-22 VITALS
DIASTOLIC BLOOD PRESSURE: 82 MMHG | BODY MASS INDEX: 33.56 KG/M2 | HEART RATE: 81 BPM | SYSTOLIC BLOOD PRESSURE: 122 MMHG | WEIGHT: 213.8 LBS | TEMPERATURE: 98.9 F | HEIGHT: 67 IN

## 2025-07-22 DIAGNOSIS — R06.83 SNORING: ICD-10-CM

## 2025-07-22 DIAGNOSIS — E78.49 OTHER HYPERLIPIDEMIA: ICD-10-CM

## 2025-07-22 DIAGNOSIS — E66.811 OBESITY, CLASS I, BMI 30-34.9: Primary | ICD-10-CM

## 2025-07-22 PROCEDURE — 99213 OFFICE O/P EST LOW 20 MIN: CPT | Performed by: PHYSICIAN ASSISTANT

## 2025-07-22 RX ORDER — PHENTERMINE HYDROCHLORIDE 30 MG/1
30 CAPSULE ORAL EVERY MORNING
Qty: 30 CAPSULE | Refills: 1 | Status: SHIPPED | OUTPATIENT
Start: 2025-07-22

## 2025-07-22 NOTE — PROGRESS NOTES
Assessment/Plan:    1. Obesity, Class I, BMI 30-34.9  phentermine 30 MG capsule      2. Other hyperlipidemia        3. Snoring  Ambulatory referral to Sleep Medicine            Initial: 223 lbs (4/11/24)  Last visit: 214 lbs BMI 33.55 (3/24/25)  Current:  213 lbs BMI 33.49 (7/22/25)  Change: -10 lbs   Goal: 185 lbs    - Weight not at goal  - Patient is interested in Conservative Program  - Labs reviewed: As below.    General Recommendations:  Nutrition:  Eat breakfast daily.  Do not skip meals.     Food log (ie.) www.myfitnesspal.com, sparkpeople.com, loseit.com, calorieking.com, etc.    Practice mindful eating.  Be sure to set aside time to eat, eat slowly, and savor your food.    Hydration:    At least 64oz of water daily.  No sugar sweetened beverages.  No juice (eat the fruit instead).    Exercise:  Studies have shown that the ideal exercise goal is somewhere between 150 to 300 minutes of moderate intensity exercise a week.  Start with exercising 10 minutes every other day and gradually increase physical activity with a goal of at least 150 minutes of moderate intensity exercise a week, divided over at least 3 days a week.  An example of this would be exercising 30 minutes a day, 5 days a week.  Resistance training can increase muscle mass and increase our resting metabolic rate.   FULL BODY resistance training is recommended 2-3 times a week.  Do not do this on consecutive days to allow for muscle recovery.    Aim for a bare minimum 5000 steps, even on days you do not exercise.    Monitoring:   Weigh yourself daily.  If this causes undue stress, then just weigh yourself once a week.  Weigh yourself the same time of the day with the same amount of clothing on.  Preferably this should be done after waking up, before you eat, and with no clothing or minimal clothing on.    Calorie goal:  [id number] rickey/day    Return visit:    Calorie tracking  Exercise - walking 2x daily. Doing resistance training  AOM  Already on  topamax for migraines  Not a good candidate for wellbutrin d/t hx of seizures  Insurance not covering GLP1 for AOM  Patient does report snoring - ordered a sleep medicine referral. If moderate-severe SUNITA, can try zepbound under SUNITA indications  Discussed zepbound through [facility]. Would prescribe 10mg, have patient inject .12mL weekly [id number], .25mL x6 weeks, then prescribe 15mg and inject x.25mL x8 weeks  Otherwise, increase phentermine to 30mg in the meantime  RTC: 4 months         Subjective:   Excess weight       Patient ID: Freida Zepeda  is a 45 y.o. female with excess weight/obesity here to pursue weight managment.  Patient is pursuing Conservative Program with      Started IF  Metformin previously, caused constipation per patient report  Maintains on topamax d/t migraines    Started on phentermine to mimic Qsymia.Was doing very well on 15mg and then 30mg. Did feel like her vision was blurry at 37.5mg. She was seen by optometrist and reportedly had normal eye pressures at this time. She discontinued 2 months ago.  Noticing increased appetite and cravings. Has gained some weight back.    Prescribed zepbound 3/2025 and denied by insurance as plan exclusion      Obesity/Excess Weight:  Severity: Moderate  Onset:  Since 2017     Modifiers: Diet and Exercise, Physician Supervised Weight Loss Program and Commercial Weight Loss Programs-ie. Weight Watchers, [employer], Nutrisystem, etc.  Contributing factors: unsure   Associated symptoms: comorbid conditions and decreased self esteem       Food logging:  Exercise: walking 2x/day 1-1.5 miles  Hydration:>64  oz of water        1 cup of coffee, black        Occasional  soda        Unsweetened iced tea  Sleep: 7-8 hours      Food recall:   B: yogurt or protein bar  S: skips  L; 2-3 eggs, avocado OR salad/wrap  S: turkey roll ups. salad + vinegar, apple + PB  D:grilled chicken/kebabs  S: n/a     Hyrdation - water, unsweetened iced tea, 1 cup, alcohol (1-2  "beers/week)     The following portions of the patient's history were reviewed and updated as appropriate: allergies, current medications, past family history, past medical history, past social history, past surgical history and problem list.    Review of Systems   HENT:  Negative for sore throat.    Respiratory:  Negative for cough and shortness of breath.    Cardiovascular:  Negative for chest pain and palpitations.   Gastrointestinal:  Negative for abdominal pain, constipation, diarrhea, nausea and vomiting.        Denies GERD   Skin:  Negative for rash.   Psychiatric/Behavioral:  Negative for suicidal ideas (denies HI).         Denies depression and anxiety       Objective:    /82   Pulse 81   Temp 98.9 °F (37.2 °C) (Tympanic)   Ht 5' 7\" (1.702 m)   Wt 97 kg (213 lb 12.8 oz)   LMP  (LMP Unknown)   BMI 33.49 kg/m²      Physical Exam  Vitals and nursing note reviewed.     Constitutional   General appearance: Abnormal.  well developed and obese.   Eyes No conjunctival injection   Pulmonary   Respiratory effort: No increased work of breathing or signs of respiratory distress.    Abdomen   Abdomen: Abnormal.  The abdomen was obese.  Musculoskeletal   Gait and station: Normal.    Skin  Dry. No visible rashes   Psychiatric   Orientation to person, place and time: Normal.    Affect: appropriate  Neurological   Normal gait     "